# Patient Record
Sex: FEMALE | Race: WHITE | HISPANIC OR LATINO | Employment: UNEMPLOYED | URBAN - METROPOLITAN AREA
[De-identification: names, ages, dates, MRNs, and addresses within clinical notes are randomized per-mention and may not be internally consistent; named-entity substitution may affect disease eponyms.]

---

## 2017-01-27 ENCOUNTER — HOSPITAL ENCOUNTER (OUTPATIENT)
Dept: FAMILY MEDICINE CLINIC | Facility: CLINIC | Age: 42
Setting detail: SPECIMEN
Discharge: HOME | End: 2017-01-27
Attending: FAMILY MEDICINE | Admitting: FAMILY MEDICINE

## 2017-01-27 LAB
ANION GAP SERPL CALC-SCNC: 9.6 MMOL/L (ref 10–20)
BASOPHILS # BLD AUTO: 0 10*3/UL (ref 0–0.2)
BASOPHILS NFR BLD AUTO: 1 % (ref 0–2)
BUN SERPL-MCNC: 14 MG/DL (ref 8–20)
BUN/CREAT SERPL: 23.3 (ref 5.4–26.2)
CALCIUM SERPL-MCNC: 9.4 MG/DL (ref 8.9–10.3)
CHLORIDE SERPL-SCNC: 105 MMOL/L (ref 101–111)
CONV CO2: 29 MMOL/L (ref 22–32)
CREAT UR-MCNC: 0.6 MG/DL (ref 0.4–1)
DIFFERENTIAL METHOD BLD: (no result)
EOSINOPHIL # BLD AUTO: 0.2 10*3/UL (ref 0–0.3)
EOSINOPHIL # BLD AUTO: 3 % (ref 0–3)
ERYTHROCYTE [DISTWIDTH] IN BLOOD BY AUTOMATED COUNT: 15.1 % (ref 11.5–14.5)
GLUCOSE SERPL-MCNC: 95 MG/DL (ref 65–99)
HCT VFR BLD AUTO: 38.7 % (ref 35–49)
HGB BLD-MCNC: 12.8 G/DL (ref 12–15)
LYMPHOCYTES # BLD AUTO: 2.2 10*3/UL (ref 0.8–4.8)
LYMPHOCYTES NFR BLD AUTO: 32 % (ref 18–42)
MCH RBC QN AUTO: 27.7 PG (ref 26–32)
MCHC RBC AUTO-ENTMCNC: 33.2 G/DL (ref 32–36)
MCV RBC AUTO: 83.4 FL (ref 80–94)
MONOCYTES # BLD AUTO: 0.5 10*3/UL (ref 0.1–1.3)
MONOCYTES NFR BLD AUTO: 7 % (ref 2–11)
NEUTROPHILS # BLD AUTO: 4 10*3/UL (ref 2.3–8.6)
NEUTROPHILS NFR BLD AUTO: 57 % (ref 50–75)
NRBC BLD AUTO-RTO: 0 /100{WBCS}
NRBC/RBC NFR BLD MANUAL: 0 10*3/UL
PLATELET # BLD AUTO: 279 10*3/UL (ref 150–450)
PMV BLD AUTO: 7.7 FL (ref 7.4–10.4)
POTASSIUM SERPL-SCNC: 4.6 MMOL/L (ref 3.6–5.1)
RBC # BLD AUTO: 4.64 10*6/UL (ref 4–5.4)
SODIUM SERPL-SCNC: 139 MMOL/L (ref 136–144)
WBC # BLD AUTO: 7 10*3/UL (ref 4.5–11.5)

## 2017-04-20 ENCOUNTER — HOSPITAL ENCOUNTER (OUTPATIENT)
Dept: FAMILY MEDICINE CLINIC | Facility: CLINIC | Age: 42
Setting detail: SPECIMEN
Discharge: HOME | End: 2017-04-20
Attending: FAMILY MEDICINE | Admitting: FAMILY MEDICINE

## 2017-04-20 LAB
ANION GAP SERPL CALC-SCNC: 12.4 MMOL/L (ref 10–20)
BASOPHILS # BLD AUTO: 0 10*3/UL (ref 0–0.2)
BASOPHILS NFR BLD AUTO: 1 % (ref 0–2)
BUN SERPL-MCNC: 15 MG/DL (ref 8–20)
BUN/CREAT SERPL: 21.4 (ref 5.4–26.2)
CALCIUM SERPL-MCNC: 9 MG/DL (ref 8.9–10.3)
CHLORIDE SERPL-SCNC: 107 MMOL/L (ref 101–111)
CONV CO2: 26 MMOL/L (ref 22–32)
CREAT UR-MCNC: 0.7 MG/DL (ref 0.4–1)
DIFFERENTIAL METHOD BLD: (no result)
EOSINOPHIL # BLD AUTO: 0.2 10*3/UL (ref 0–0.3)
EOSINOPHIL # BLD AUTO: 4 % (ref 0–3)
ERYTHROCYTE [DISTWIDTH] IN BLOOD BY AUTOMATED COUNT: 15 % (ref 11.5–14.5)
GLUCOSE SERPL-MCNC: 91 MG/DL (ref 65–99)
HCT VFR BLD AUTO: 36.9 % (ref 35–49)
HGB BLD-MCNC: 12.3 G/DL (ref 12–15)
LYMPHOCYTES # BLD AUTO: 2.4 10*3/UL (ref 0.8–4.8)
LYMPHOCYTES NFR BLD AUTO: 35 % (ref 18–42)
MCH RBC QN AUTO: 27.5 PG (ref 26–32)
MCHC RBC AUTO-ENTMCNC: 33.2 G/DL (ref 32–36)
MCV RBC AUTO: 82.9 FL (ref 80–94)
MONOCYTES # BLD AUTO: 0.4 10*3/UL (ref 0.1–1.3)
MONOCYTES NFR BLD AUTO: 6 % (ref 2–11)
NEUTROPHILS # BLD AUTO: 3.7 10*3/UL (ref 2.3–8.6)
NEUTROPHILS NFR BLD AUTO: 54 % (ref 50–75)
NRBC BLD AUTO-RTO: 0 /100{WBCS}
NRBC/RBC NFR BLD MANUAL: 0 10*3/UL
PLATELET # BLD AUTO: 312 10*3/UL (ref 150–450)
PMV BLD AUTO: 7.8 FL (ref 7.4–10.4)
POTASSIUM SERPL-SCNC: 4.4 MMOL/L (ref 3.6–5.1)
RBC # BLD AUTO: 4.45 10*6/UL (ref 4–5.4)
SODIUM SERPL-SCNC: 141 MMOL/L (ref 136–144)
WBC # BLD AUTO: 6.8 10*3/UL (ref 4.5–11.5)

## 2017-04-27 ENCOUNTER — HOSPITAL ENCOUNTER (OUTPATIENT)
Dept: FAMILY MEDICINE CLINIC | Facility: CLINIC | Age: 42
Setting detail: SPECIMEN
Discharge: HOME | End: 2017-04-27
Attending: FAMILY MEDICINE | Admitting: FAMILY MEDICINE

## 2017-04-27 LAB — TSH SERPL-ACNC: 1.02 UIU/ML (ref 0.34–5.6)

## 2017-09-27 ENCOUNTER — HOSPITAL ENCOUNTER (OUTPATIENT)
Dept: CARDIOLOGY | Facility: HOSPITAL | Age: 42
Discharge: HOME | End: 2017-09-27
Attending: SURGERY | Admitting: SURGERY

## 2017-11-16 ENCOUNTER — HOSPITAL ENCOUNTER (OUTPATIENT)
Dept: FAMILY MEDICINE CLINIC | Facility: CLINIC | Age: 42
Setting detail: SPECIMEN
Discharge: HOME | End: 2017-11-16
Attending: FAMILY MEDICINE | Admitting: FAMILY MEDICINE

## 2017-11-16 LAB
ANION GAP SERPL CALC-SCNC: 11.7 MMOL/L (ref 10–20)
BUN SERPL-MCNC: 12 MG/DL (ref 8–20)
BUN/CREAT SERPL: 17.1 (ref 5.4–26.2)
CALCIUM SERPL-MCNC: 8.9 MG/DL (ref 8.9–10.3)
CHLORIDE SERPL-SCNC: 104 MMOL/L (ref 101–111)
CONV CO2: 26 MMOL/L (ref 22–32)
CREAT UR-MCNC: 0.7 MG/DL (ref 0.4–1)
GLUCOSE SERPL-MCNC: 95 MG/DL (ref 65–99)
POTASSIUM SERPL-SCNC: 3.7 MMOL/L (ref 3.6–5.1)
SODIUM SERPL-SCNC: 138 MMOL/L (ref 136–144)
TSH SERPL-ACNC: 1.63 UIU/ML (ref 0.34–5.6)

## 2018-05-08 ENCOUNTER — HOSPITAL ENCOUNTER (OUTPATIENT)
Dept: FAMILY MEDICINE CLINIC | Facility: CLINIC | Age: 43
Setting detail: SPECIMEN
Discharge: HOME | End: 2018-05-08
Attending: FAMILY MEDICINE | Admitting: FAMILY MEDICINE

## 2018-05-08 LAB
ALBUMIN SERPL-MCNC: 3.8 G/DL (ref 3.5–4.8)
ALBUMIN/GLOB SERPL: 1.1 {RATIO} (ref 1–1.7)
ALP SERPL-CCNC: 120 IU/L (ref 32–91)
ALT SERPL-CCNC: 22 IU/L (ref 14–54)
ANION GAP SERPL CALC-SCNC: 11 MMOL/L (ref 10–20)
AST SERPL-CCNC: 24 IU/L (ref 15–41)
BILIRUB SERPL-MCNC: 0.4 MG/DL (ref 0.3–1.2)
BUN SERPL-MCNC: 13 MG/DL (ref 8–20)
BUN/CREAT SERPL: 18.6 (ref 5.4–26.2)
CALCIUM SERPL-MCNC: 9.1 MG/DL (ref 8.9–10.3)
CHLORIDE SERPL-SCNC: 104 MMOL/L (ref 101–111)
CHOLEST SERPL-MCNC: 163 MG/DL
CHOLEST/HDLC SERPL: 3.4 {RATIO}
CONV CO2: 27 MMOL/L (ref 22–32)
CONV LDL CHOLESTEROL DIRECT: 95 MG/DL (ref 0–100)
CONV TOTAL PROTEIN: 7.3 G/DL (ref 6.1–7.9)
CREAT UR-MCNC: 0.7 MG/DL (ref 0.4–1)
GLOBULIN UR ELPH-MCNC: 3.5 G/DL (ref 2.5–3.8)
GLUCOSE SERPL-MCNC: 87 MG/DL (ref 65–99)
HDLC SERPL-MCNC: 48 MG/DL
LDLC/HDLC SERPL: 2 {RATIO}
LIPID INTERPRETATION: NORMAL
POTASSIUM SERPL-SCNC: 4 MMOL/L (ref 3.6–5.1)
SODIUM SERPL-SCNC: 138 MMOL/L (ref 136–144)
TRIGL SERPL-MCNC: 83 MG/DL
VLDLC SERPL CALC-MCNC: 20.1 MG/DL

## 2019-07-10 RX ORDER — LEVOTHYROXINE SODIUM 0.1 MG/1
TABLET ORAL
Qty: 90 TABLET | Refills: 0 | Status: SHIPPED | OUTPATIENT
Start: 2019-07-10 | End: 2019-10-12 | Stop reason: SDUPTHER

## 2019-09-10 RX ORDER — FUROSEMIDE 20 MG/1
1 TABLET ORAL DAILY
COMMUNITY
Start: 2017-10-21 | End: 2019-10-21

## 2019-09-20 ENCOUNTER — LAB (OUTPATIENT)
Dept: FAMILY MEDICINE CLINIC | Facility: CLINIC | Age: 44
End: 2019-09-20

## 2019-09-20 ENCOUNTER — OFFICE VISIT (OUTPATIENT)
Dept: FAMILY MEDICINE CLINIC | Facility: CLINIC | Age: 44
End: 2019-09-20

## 2019-09-20 ENCOUNTER — TELEPHONE (OUTPATIENT)
Dept: FAMILY MEDICINE CLINIC | Facility: CLINIC | Age: 44
End: 2019-09-20

## 2019-09-20 VITALS
HEART RATE: 57 BPM | BODY MASS INDEX: 35.17 KG/M2 | DIASTOLIC BLOOD PRESSURE: 68 MMHG | HEIGHT: 64 IN | OXYGEN SATURATION: 100 % | SYSTOLIC BLOOD PRESSURE: 110 MMHG | WEIGHT: 206 LBS | TEMPERATURE: 97.5 F

## 2019-09-20 DIAGNOSIS — M53.3 TAIL BONE PAIN: ICD-10-CM

## 2019-09-20 DIAGNOSIS — Z00.00 PREVENTATIVE HEALTH CARE: Primary | ICD-10-CM

## 2019-09-20 DIAGNOSIS — Z00.00 PREVENTATIVE HEALTH CARE: ICD-10-CM

## 2019-09-20 LAB
ALBUMIN SERPL-MCNC: 3.9 G/DL (ref 3.5–4.8)
ALBUMIN/GLOB SERPL: 1.2 G/DL (ref 1–1.7)
ALP SERPL-CCNC: 98 U/L (ref 32–91)
ALT SERPL W P-5'-P-CCNC: 12 U/L (ref 14–54)
ANION GAP SERPL CALCULATED.3IONS-SCNC: 11.7 MMOL/L (ref 5–15)
ARTICHOKE IGE QN: 74 MG/DL (ref 0–100)
AST SERPL-CCNC: 15 U/L (ref 15–41)
BILIRUB SERPL-MCNC: 0.7 MG/DL (ref 0.3–1.2)
BUN BLD-MCNC: 14 MG/DL (ref 8–20)
BUN/CREAT SERPL: 20 (ref 5.4–26.2)
CALCIUM SPEC-SCNC: 9.2 MG/DL (ref 8.9–10.3)
CHLORIDE SERPL-SCNC: 104 MMOL/L (ref 101–111)
CHOLEST SERPL-MCNC: 132 MG/DL
CO2 SERPL-SCNC: 27 MMOL/L (ref 22–32)
CREAT BLD-MCNC: 0.7 MG/DL (ref 0.4–1)
DEPRECATED RDW RBC AUTO: 51.2 FL (ref 37–54)
ERYTHROCYTE [DISTWIDTH] IN BLOOD BY AUTOMATED COUNT: 17.2 % (ref 12.3–15.4)
GFR SERPL CREATININE-BSD FRML MDRD: 110 ML/MIN/1.73
GFR SERPL CREATININE-BSD FRML MDRD: 90 ML/MIN/1.73
GLOBULIN UR ELPH-MCNC: 3.3 GM/DL (ref 2.5–3.8)
GLUCOSE BLD-MCNC: 92 MG/DL (ref 65–99)
HCT VFR BLD AUTO: 40 % (ref 34–46.6)
HDLC SERPL QL: 3
HDLC SERPL-MCNC: 44 MG/DL
HGB BLD-MCNC: 13.2 G/DL (ref 12–15.9)
LDLC/HDLC SERPL: 1.77 {RATIO}
MCH RBC QN AUTO: 28.3 PG (ref 26.6–33)
MCHC RBC AUTO-ENTMCNC: 33.1 G/DL (ref 31.5–35.7)
MCV RBC AUTO: 85.5 FL (ref 79–97)
PLATELET # BLD AUTO: 296 10*3/MM3 (ref 140–450)
PMV BLD AUTO: 7.9 FL (ref 6–12)
POTASSIUM BLD-SCNC: 4.7 MMOL/L (ref 3.6–5.1)
PROT SERPL-MCNC: 7.2 G/DL (ref 6.1–7.9)
RBC # BLD AUTO: 4.67 10*6/MM3 (ref 3.77–5.28)
SODIUM BLD-SCNC: 138 MMOL/L (ref 136–144)
TRIGL SERPL-MCNC: 51 MG/DL
TSH SERPL DL<=0.05 MIU/L-ACNC: 3.66 UIU/ML (ref 0.34–5.6)
VLDLC SERPL-MCNC: 10.2 MG/DL
WBC NRBC COR # BLD: 6.7 10*3/MM3 (ref 3.4–10.8)

## 2019-09-20 PROCEDURE — 80053 COMPREHEN METABOLIC PANEL: CPT | Performed by: FAMILY MEDICINE

## 2019-09-20 PROCEDURE — 99212 OFFICE O/P EST SF 10 MIN: CPT | Performed by: FAMILY MEDICINE

## 2019-09-20 PROCEDURE — 84443 ASSAY THYROID STIM HORMONE: CPT | Performed by: FAMILY MEDICINE

## 2019-09-20 PROCEDURE — 80061 LIPID PANEL: CPT | Performed by: FAMILY MEDICINE

## 2019-09-20 PROCEDURE — 36415 COLL VENOUS BLD VENIPUNCTURE: CPT

## 2019-09-20 PROCEDURE — 85027 COMPLETE CBC AUTOMATED: CPT | Performed by: FAMILY MEDICINE

## 2019-09-20 PROCEDURE — 99396 PREV VISIT EST AGE 40-64: CPT | Performed by: FAMILY MEDICINE

## 2019-09-20 NOTE — PROGRESS NOTES
Subjective   Catherine Nicholas is a 44 y.o. female.     The patient presents with annual physical exam.  She is doing well, complaining of tailbone pain for few months.  She has a known history of fall twice slipped from ice.  The pain describe dull achy, intermittent, mild to moderate intensity and radiating to right hip and leg.  She is a non-smoker.  She is watching diet and has lost more than 50 pound in 5-month.         The following portions of the patient's history were reviewed and updated as appropriate: past medical history, past social history, past surgical history and problem list.    Review of Systems   Constitutional: Negative for activity change, fatigue and fever.   HENT: Negative for sinus pressure, sore throat and trouble swallowing.    Eyes: Negative for blurred vision and visual disturbance.   Respiratory: Negative for cough, shortness of breath and wheezing.    Cardiovascular: Negative for chest pain and palpitations.   Gastrointestinal: Negative for abdominal pain, nausea and vomiting.   Endocrine: Negative for cold intolerance, polyphagia and polyuria.   Genitourinary: Negative for dysuria and flank pain.   Musculoskeletal: Positive for back pain.        Tail bone pain   Neurological: Negative for dizziness, weakness and headache.   Psychiatric/Behavioral: Negative for depressed mood. The patient is not nervous/anxious.        Objective   Physical Exam   Constitutional: She is oriented to person, place, and time. She appears well-developed.   HENT:   Head: Normocephalic.   Mouth/Throat: Oropharynx is clear and moist.   Eyes: EOM are normal. Pupils are equal, round, and reactive to light.   Neck: Normal range of motion. Neck supple. No thyromegaly present.   Cardiovascular: Normal rate and regular rhythm.   Pulmonary/Chest: Effort normal and breath sounds normal. She has no wheezes.   Abdominal: Soft. Bowel sounds are normal. There is no tenderness.   Musculoskeletal: Normal range of motion.    Negative tenderness at lower back and right hip.     Neurological: She is alert and oriented to person, place, and time.   Skin: Skin is warm.   Psychiatric: She has a normal mood and affect.   Vitals reviewed.        Assessment/Plan   Problems Addressed this Visit        Nervous and Auditory    Tail bone pain     Symptom management, will check x-ray and refer for physical therapy.         Relevant Orders    XR Sacrum & Coccyx    Ambulatory Referral to Physical Therapy       Other    Preventative health care - Primary     Discussed preventive care protocol and  importance of regular exercise and recommend starting or continuing a regular exercise program for good health.  Annual flu shots are recommended.         Relevant Orders    Lipid panel    Comprehensive metabolic panel    CBC No Differential    TSH

## 2019-09-20 NOTE — ASSESSMENT & PLAN NOTE
Discussed preventive care protocol and  importance of regular exercise and recommend starting or continuing a regular exercise program for good health.  Annual flu shots are recommended.

## 2019-09-23 ENCOUNTER — TELEPHONE (OUTPATIENT)
Dept: FAMILY MEDICINE CLINIC | Facility: CLINIC | Age: 44
End: 2019-09-23

## 2019-09-23 NOTE — TELEPHONE ENCOUNTER
Patient needing to know xray results and told her thought they were normal, but still having a lot of pain.

## 2019-09-24 ENCOUNTER — TREATMENT (OUTPATIENT)
Dept: PHYSICAL THERAPY | Facility: CLINIC | Age: 44
End: 2019-09-24

## 2019-09-24 DIAGNOSIS — M53.3 COCCYX PAIN: Primary | ICD-10-CM

## 2019-09-24 PROCEDURE — 97110 THERAPEUTIC EXERCISES: CPT | Performed by: PHYSICAL THERAPIST

## 2019-09-24 PROCEDURE — 97014 ELECTRIC STIMULATION THERAPY: CPT | Performed by: PHYSICAL THERAPIST

## 2019-09-24 PROCEDURE — 97161 PT EVAL LOW COMPLEX 20 MIN: CPT | Performed by: PHYSICAL THERAPIST

## 2019-09-24 NOTE — PROGRESS NOTES
Physical Therapy Initial Evaluation and Plan of Care    Patient: Catherine Nicholas   : 1975  Diagnosis/ICD-10 Code:  Coccyx pain [M53.3]  Referring practitioner: Gilberto Manzanares MD  Date of Initial Visit: 2019  Today's Date: 2019  Patient seen for  sessions           Subjective Questionnaire: Oswestry:       Subjective Evaluation    History of Present Illness  Mechanism of injury: Pt is a 45 yo female with c/o increased pain in tailbone/R post hip.  Initial pain began after fall in 2019, then another slip/fall on ice in 2019.  Has been losing weight and noticed that pain worsened as she lost more weight. Doesn't bother her currently when sitting but really hurts in her bed if she tries to sit (long sitting) sitting to read.  Inc pull after she gets out of car when she drives for very long.  Current pain in tailbone - none.  When she is sitting in her bed pain up to 7-8/10.  Has tried a donut pillow with no benefit.  Pain more on her R side.  Worse with shifting weight to L and lifting her R foot up, better the opposite.  Has been sleeping on her L side with R leg up.  Typically would use several pillows and alternate sides.  At times pain wakes her L side becomes uncomfortable.  R leg irritates her with walking up stairs.     Sits a lot at work and feels a pulling pain in her R side into hip/thigh.      Quality of life: good    Pain  Current pain ratin  At best pain ratin  At worst pain ratin  Location: Central, R LB into glut  Quality: dull ache  Relieving factors: change in position  Aggravating factors: sleeping and prolonged positioning    Social Support  Lives in: one-story house  Lives with: spouse    Diagnostic Tests  X-ray: normal (priority)    Treatments  Current treatment: physical therapy  Patient Goals  Patient goals for therapy: decreased pain, increased motion and independence with ADLs/IADLs             Objective       Palpation     Additional Palpation  Details  Tenderness localized R SI and piriformis muscle    Lumbar Screen  Lumbar range of motion within normal limits.    Strength/Myotome Testing     Left Hip   Planes of Motion   Flexion: 5  Abduction: 5  Adduction: 5    Right Hip   Planes of Motion   Flexion: 5  Abduction: 5  Adduction: 5    Left Knee   Flexion: 5  Extension: 5    Right Knee   Flexion: 5  Extension: 5    Left Ankle/Foot   Dorsiflexion: 5    Right Ankle/Foot   Dorsiflexion: 5         Assessment & Plan     Assessment  Impairments: activity intolerance and pain with function  Assessment details: Pt has increased pain/tenderness tailbone and R post hip.  Difficulty longsitting.  Inc pain with recliner positioning.  Inc pulling after driving.  Dec R hip IR/ER active mobility.  Difficulty climbing stairs.  Difficulty sleeping.    Prognosis: good  Functional Limitations: sleeping, uncomfortable because of pain and sitting  Goals  Plan Goals: STG  Pt I with HEP in 2 weeks  To dec pain in R post hip 2-3/10 max in 2-3 weeks  To jo ann driving without inc pain/pulling in 2-3 weeks  LTG  To improve R hip ROM WFL with no inc pain in 4-6 weeks  To dec pain 0-1/10 max in 4-6 weeks  To jo ann sleep without inc pain in 4-6 weeks.    To jo ann longsitting without inc pain in 4-6 weeks.      Plan  Therapy options: will be seen for skilled physical therapy services  Planned modality interventions: electrical stimulation/Russian stimulation, thermotherapy (hydrocollator packs), ultrasound and cryotherapy  Planned therapy interventions: abdominal trunk stabilization, ADL retraining, body mechanics training, flexibility, functional ROM exercises, manual therapy, neuromuscular re-education, postural training, soft tissue mobilization, strengthening, stretching and therapeutic activities  Frequency: 1x week  Duration in visits: 10  Treatment plan discussed with: patient  Plan details: Provided HEP and completed estim R piriformis with dec pain and good jo ann post tx.  Plan to  focus on gentle mobility and gentle progressive hip strengthening.  Will continue modalities as needed for pain.          Timed:         Manual Therapy:         mins  27883;     Therapeutic Exercise:    10     mins  53285;     Neuromuscular Ladan:        mins  06786;    Therapeutic Activity:          mins  29301;     Gait Training:           mins  36974;     Ultrasound:          mins  53817;    Ionto                                   mins   12926    Un-Timed:  Electrical Stimulation:    15     mins  50044 ( );  Dry Needling          mins self-pay  Traction          mins 62147  Low Eval     30     Mins  02056  Mod Eval          Mins  47639  High Eval                            Mins  49683        Timed Treatment:   10   mins   Total Treatment:     55   mins    PT SIGNATURE: Raquel Banks, PT   DATE TREATMENT INITIATED: 9/24/2019    Initial Certification  Certification Period: 12/23/2019  I certify that the therapy services are furnished while this patient is under my care.  The services outlined above are required by this patient, and will be reviewed every 90 days.     PHYSICIAN: Gilberto Manzanares MD      DATE:     Please sign and return via fax to [phone].. Thank you, [facility].

## 2019-09-30 ENCOUNTER — OFFICE VISIT (OUTPATIENT)
Dept: FAMILY MEDICINE CLINIC | Facility: CLINIC | Age: 44
End: 2019-09-30

## 2019-09-30 VITALS
HEART RATE: 67 BPM | WEIGHT: 205 LBS | BODY MASS INDEX: 35 KG/M2 | TEMPERATURE: 98.5 F | OXYGEN SATURATION: 99 % | DIASTOLIC BLOOD PRESSURE: 70 MMHG | SYSTOLIC BLOOD PRESSURE: 104 MMHG | HEIGHT: 64 IN

## 2019-09-30 DIAGNOSIS — Z01.419 ENCNTR FOR GYN EXAM (GENERAL) (ROUTINE) W/O ABN FINDINGS: Primary | ICD-10-CM

## 2019-09-30 DIAGNOSIS — Z12.31 ENCOUNTER FOR SCREENING MAMMOGRAM FOR BREAST CANCER: ICD-10-CM

## 2019-09-30 PROCEDURE — 87624 HPV HI-RISK TYP POOLED RSLT: CPT | Performed by: FAMILY MEDICINE

## 2019-09-30 PROCEDURE — G0148 SCR C/V CYTO, AUTOSYS, RESCR: HCPCS

## 2019-09-30 PROCEDURE — 99396 PREV VISIT EST AGE 40-64: CPT | Performed by: FAMILY MEDICINE

## 2019-09-30 RX ORDER — METHOCARBAMOL 500 MG/1
2 TABLET, FILM COATED ORAL EVERY 8 HOURS PRN
Refills: 0 | COMMUNITY
Start: 2019-09-26 | End: 2019-10-21
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
## 2019-10-02 ENCOUNTER — TREATMENT (OUTPATIENT)
Dept: PHYSICAL THERAPY | Facility: CLINIC | Age: 44
End: 2019-10-02

## 2019-10-02 DIAGNOSIS — M53.3 COCCYX PAIN: Primary | ICD-10-CM

## 2019-10-02 PROCEDURE — 97014 ELECTRIC STIMULATION THERAPY: CPT | Performed by: PHYSICAL THERAPIST

## 2019-10-02 PROCEDURE — 97140 MANUAL THERAPY 1/> REGIONS: CPT | Performed by: PHYSICAL THERAPIST

## 2019-10-02 PROCEDURE — 97110 THERAPEUTIC EXERCISES: CPT | Performed by: PHYSICAL THERAPIST
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
## 2019-10-03 LAB
AGE GDLN ACOG TESTING: NORMAL
CHROM ANALY OVERALL INTERP-IMP: NORMAL
CONV .: NORMAL
CONV PERFORMED BY:: NORMAL
DX ICD CODE: NORMAL
HIV 1 & 2 AB SER-IMP: NORMAL
HPV I/H RISK 1 DNA CVX QL PROBE+SIG AMP: NEGATIVE
REF LAB TEST METHOD: NORMAL
STAT OF ADQ CVX/VAG CYTO-IMP: NORMAL

## 2019-10-04 ENCOUNTER — APPOINTMENT (OUTPATIENT)
Dept: MAMMOGRAPHY | Facility: HOSPITAL | Age: 44
End: 2019-10-04

## 2019-10-09 ENCOUNTER — TREATMENT (OUTPATIENT)
Dept: PHYSICAL THERAPY | Facility: CLINIC | Age: 44
End: 2019-10-09

## 2019-10-09 DIAGNOSIS — M53.3 COCCYX PAIN: Primary | ICD-10-CM

## 2019-10-09 PROCEDURE — 97014 ELECTRIC STIMULATION THERAPY: CPT | Performed by: PHYSICAL THERAPIST

## 2019-10-09 PROCEDURE — 97140 MANUAL THERAPY 1/> REGIONS: CPT | Performed by: PHYSICAL THERAPIST

## 2019-10-09 PROCEDURE — 97110 THERAPEUTIC EXERCISES: CPT | Performed by: PHYSICAL THERAPIST

## 2019-10-09 NOTE — PROGRESS NOTES
Physical Therapy Daily Progress Note        Patient: Catherine Nicholas   : 1975  Diagnosis/ICD-10 Code:  Coccyx pain [M53.3]  Referring practitioner: Gilberto Manzanares MD  Date of Initial Visit: Type: THERAPY  Noted: 2019  Today's Date: 10/9/2019  Patient seen for 3 sessions             Subjective Was hurting really bad in lower back  and Monday. Some better today.  Still mostly R and central lower back.  Neck is improving but really irrtated it last week at [employer] study because she was looking down for 45 min at her tablet.      Objective No new changes.    See Exercise, Manual, and Modality Logs for complete treatment.       Assessment/Plan  Poor thoracic mobility.  Moderate pelvic asymmetry R ASIS ant to L.      Progress per Plan of Care           Timed:         Manual Therapy:    8     mins  71495;     Therapeutic Exercise:    15     mins  44566;     Neuromuscular Ladan:        mins  95691;    Therapeutic Activity:          mins  73599;     Gait Training:           mins  97922;     Ultrasound:          mins  85975;    Ionto                                   mins   48488  Self Care                            mins   35173  Canalith Repos                   mins  4209    Un-Timed:  Electrical Stimulation:    15     mins  08573 ( );  Dry Needling          mins self-pay  Traction          mins 20438  Low Eval          Mins  63779  Mod Eval          Mins  98357  High Eval                            Mins  29706    Timed Treatment:   23   mins   Total Treatment:     38   mins    Raquel Banks PT  Physical Therapist

## 2019-10-12 RX ORDER — LEVOTHYROXINE SODIUM 0.1 MG/1
TABLET ORAL
Qty: 90 TABLET | Refills: 0 | Status: SHIPPED | OUTPATIENT
Start: 2019-10-12 | End: 2020-01-08

## 2019-10-16 ENCOUNTER — HOSPITAL ENCOUNTER (OUTPATIENT)
Dept: MAMMOGRAPHY | Facility: HOSPITAL | Age: 44
Discharge: HOME | End: 2019-10-16
Admitting: FAMILY MEDICINE

## 2019-10-16 PROCEDURE — 77063 BREAST TOMOSYNTHESIS BI: CPT

## 2019-10-16 PROCEDURE — 77067 SCR MAMMO BI INCL CAD: CPT

## 2019-10-17 ENCOUNTER — TREATMENT (OUTPATIENT)
Dept: PHYSICAL THERAPY | Facility: CLINIC | Age: 44
End: 2019-10-17

## 2019-10-17 DIAGNOSIS — M53.3 COCCYX PAIN: Primary | ICD-10-CM

## 2019-10-17 PROCEDURE — 97014 ELECTRIC STIMULATION THERAPY: CPT | Performed by: PHYSICAL THERAPIST

## 2019-10-17 PROCEDURE — 97140 MANUAL THERAPY 1/> REGIONS: CPT | Performed by: PHYSICAL THERAPIST

## 2019-10-17 PROCEDURE — 97110 THERAPEUTIC EXERCISES: CPT | Performed by: PHYSICAL THERAPIST

## 2019-10-17 NOTE — PROGRESS NOTES
Physical Therapy Daily Progress Note        Patient: Catherine Nicholas   : 1975  Diagnosis/ICD-10 Code:  Coccyx pain [M53.3]  Referring practitioner: Gilberto Manzanares MD  Date of Initial Visit: Type: THERAPY  Noted: 2019  Today's Date: 10/17/2019  Patient seen for 4 sessions             Subjective Pt stated that she is still hurting in her R side lower back and into neck/shoulders.  Planning to follow up with MD regarding continued pain.      Objective Began with manual stretching and ex.  Finished with estim/mhp.    See Exercise, Manual, and Modality Logs for complete treatment.       Assessment/Plan Mild pelvic asymmetry.  Difficulty with transfers.      Progress per Plan of Care           Timed:         Manual Therapy:    10     mins  25863;     Therapeutic Exercise:    15     mins  16379;     Neuromuscular Ladan:        mins  86306;    Therapeutic Activity:          mins  21574;     Gait Training:           mins  85325;     Ultrasound:          mins  26166;    Ionto                                   mins   16848  Self Care                            mins   67253  Canalith Repos                   mins  4209    Un-Timed:  Electrical Stimulation:    15     mins  69950 ( );  Dry Needling          mins self-pay  Traction          mins 47185  Low Eval          Mins  16939  Mod Eval          Mins  50029  High Eval                            Mins  58708    Timed Treatment:   25   mins   Total Treatment:     40   mins    Raquel Banks PT  Physical Therapist

## 2019-10-18 DIAGNOSIS — R92.8 ABNORMAL MAMMOGRAM OF LEFT BREAST: Primary | ICD-10-CM

## 2019-10-21 ENCOUNTER — OFFICE VISIT (OUTPATIENT)
Dept: FAMILY MEDICINE CLINIC | Facility: CLINIC | Age: 44
End: 2019-10-21

## 2019-10-21 VITALS
OXYGEN SATURATION: 100 % | SYSTOLIC BLOOD PRESSURE: 106 MMHG | WEIGHT: 203 LBS | HEART RATE: 48 BPM | TEMPERATURE: 99.3 F | BODY MASS INDEX: 34.66 KG/M2 | HEIGHT: 64 IN | DIASTOLIC BLOOD PRESSURE: 68 MMHG

## 2019-10-21 DIAGNOSIS — M54.50 ACUTE LOW BACK PAIN WITHOUT SCIATICA, UNSPECIFIED BACK PAIN LATERALITY: Primary | ICD-10-CM

## 2019-10-21 DIAGNOSIS — M54.2 NECK PAIN: ICD-10-CM

## 2019-10-21 PROCEDURE — 99213 OFFICE O/P EST LOW 20 MIN: CPT | Performed by: FAMILY MEDICINE

## 2019-10-21 RX ORDER — METHOCARBAMOL 500 MG/1
500 TABLET, FILM COATED ORAL 3 TIMES DAILY PRN
Qty: 20 TABLET | Refills: 0 | Status: SHIPPED | OUTPATIENT
Start: 2019-10-21 | End: 2021-02-15

## 2019-10-21 RX ORDER — MELOXICAM 15 MG/1
15 TABLET ORAL DAILY
Qty: 20 TABLET | Refills: 1 | Status: SHIPPED | OUTPATIENT
Start: 2019-10-21 | End: 2021-02-15

## 2019-10-23 ENCOUNTER — TREATMENT (OUTPATIENT)
Dept: PHYSICAL THERAPY | Facility: CLINIC | Age: 44
End: 2019-10-23

## 2019-10-23 DIAGNOSIS — M54.2 CERVICALGIA: Primary | ICD-10-CM

## 2019-10-23 DIAGNOSIS — M53.3 COCCYX PAIN: Primary | ICD-10-CM

## 2019-10-23 PROCEDURE — 97014 ELECTRIC STIMULATION THERAPY: CPT | Performed by: PHYSICAL THERAPIST

## 2019-10-23 PROCEDURE — 97161 PT EVAL LOW COMPLEX 20 MIN: CPT | Performed by: PHYSICAL THERAPIST

## 2019-10-23 PROCEDURE — 97110 THERAPEUTIC EXERCISES: CPT | Performed by: PHYSICAL THERAPIST
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
## 2019-10-23 NOTE — PROGRESS NOTES
Physical Therapy Daily Progress Note        Patient: Catherine Nicholas   : 1975  Diagnosis/ICD-10 Code:  Coccyx pain [M53.3]  Referring practitioner: Gilberto Manzanares MD  Date of Initial Visit: Type: THERAPY  Noted: 2019  Today's Date: 10/23/2019  Patient seen for 5 sessions             Subjective Pt stated she is still having R lower back pain.  Some better.  Saw MD Monday for her neck and has a new referral to start PT for that.      Objective   See Exercise, Manual, and Modality Logs for complete treatment.       Assessment/Plan Pt jo ann stretching well.     Progress strengthening /stabilization /functional activity           Timed:         Manual Therapy:    5     mins  65145;     Therapeutic Exercise:    15     mins  85169;     Neuromuscular Ladan:        mins  53364;    Therapeutic Activity:          mins  95199;     Gait Training:           mins  49971;     Ultrasound:          mins  44052;    Ionto                                   mins   00949  Self Care                            mins   39043  Canalith Repos                   mins  4209    Un-Timed:  Electrical Stimulation:    15     mins  34633 ( );  Dry Needling          mins self-pay  Traction          mins 24951  Low Eval          Mins  21462  Mod Eval          Mins  55662  High Eval                            Mins  08328    Timed Treatment:   20   mins   Total Treatment:     35   mins    Raquel Banks, PT  Physical Therapist

## 2019-10-25 ENCOUNTER — TELEPHONE (OUTPATIENT)
Dept: FAMILY MEDICINE CLINIC | Facility: CLINIC | Age: 44
End: 2019-10-25

## 2019-10-25 ENCOUNTER — HOSPITAL ENCOUNTER (OUTPATIENT)
Dept: MAMMOGRAPHY | Facility: HOSPITAL | Age: 44
Discharge: HOME | End: 2019-10-25
Admitting: FAMILY MEDICINE

## 2019-10-25 ENCOUNTER — HOSPITAL ENCOUNTER (OUTPATIENT)
Dept: ULTRASOUND IMAGING | Facility: HOSPITAL | Age: 44
Discharge: HOME | End: 2019-10-25

## 2019-10-25 DIAGNOSIS — R92.8 ABNORMAL MAMMOGRAM OF LEFT BREAST: ICD-10-CM

## 2019-10-25 DIAGNOSIS — R92.8 ABNORMAL MAMMOGRAM OF LEFT BREAST: Primary | ICD-10-CM

## 2019-10-25 PROCEDURE — 76642 ULTRASOUND BREAST LIMITED: CPT

## 2019-10-25 PROCEDURE — G0279 TOMOSYNTHESIS, MAMMO: HCPCS

## 2019-10-25 PROCEDURE — 77065 DX MAMMO INCL CAD UNI: CPT

## 2019-10-25 NOTE — TELEPHONE ENCOUNTER
Hospital called and needs you to put in an order for a U/S Core biopsy of the left breast, her diagnostic mammogram was abnormal.

## 2019-10-30 ENCOUNTER — TREATMENT (OUTPATIENT)
Dept: PHYSICAL THERAPY | Facility: CLINIC | Age: 44
End: 2019-10-30

## 2019-10-30 DIAGNOSIS — M53.3 COCCYX PAIN: Primary | ICD-10-CM

## 2019-10-30 DIAGNOSIS — M54.2 CERVICALGIA: Primary | ICD-10-CM

## 2019-10-30 PROCEDURE — 97014 ELECTRIC STIMULATION THERAPY: CPT | Performed by: PHYSICAL THERAPIST

## 2019-10-30 PROCEDURE — 97140 MANUAL THERAPY 1/> REGIONS: CPT | Performed by: PHYSICAL THERAPIST

## 2019-10-30 NOTE — PROGRESS NOTES
Physical Therapy Daily Progress Note        Patient: Catherine Nicholas   : 1975  Diagnosis/ICD-10 Code:  Coccyx pain [M53.3]  Referring practitioner: Gilberto Manzanares MD  Date of Initial Visit: Type: THERAPY  Noted: 2019  Today's Date: 10/30/2019  Patient seen for 6 sessions             Subjective Continued Lower back with worse R post hip pain.      Objective Pelvis asymmetry.  Improved with muscle energy correction.  No new changes.    See Exercise, Manual, and Modality Logs for complete treatment.       Assessment/Plan Continued pain and poor tolerance to transfers.      Progress per Plan of Care           Timed:         Manual Therapy:    15     mins  07188;     Therapeutic Exercise:    10     mins  81576;     Neuromuscular Ladan:        mins  35859;    Therapeutic Activity:          mins  80138;     Gait Training:           mins  33089;     Ultrasound:          mins  00502;    Ionto                                   mins   83652  Self Care                            mins   07364  Canalith Repos                   mins  4209    Un-Timed:  Electrical Stimulation:         mins  82293 (MC );  Dry Needling          mins self-pay  Traction          mins 18036  Low Eval          Mins  60772  Mod Eval          Mins  96974  High Eval                            Mins  03861    Timed Treatment:   25   mins   Total Treatment:     40   mins    Raquel Banks PT  Physical Therapist

## 2019-10-30 NOTE — PROGRESS NOTES
Physical Therapy Daily Progress Note        Patient: Catherine Nicholas   : 1975  Diagnosis/ICD-10 Code:  Cervicalgia [M54.2]  Referring practitioner: Gilberto Manzanares MD  Date of Initial Visit: Type: THERAPY  Noted: 10/23/2019  Today's Date: 10/30/2019  Patient seen for 2 sessions           Subjective Questionnaire: NDI:    Subjective Pt stated B shoulders( upper traps) are sore.      Objective   See Exercise, Manual, and Modality Logs for complete treatment.       Assessment/Plan Pt tolerated stretching and ex well.      Progress per Plan of Care           Timed:         Manual Therapy:    15     mins  58232;     Therapeutic Exercise:    5     mins  89044;     Neuromuscular Ladan:        mins  57457;    Therapeutic Activity:          mins  74341;     Gait Training:           mins  96072;     Ultrasound:          mins  43803;    Ionto                                   mins   13898  Self Care                            mins   03885  Canalith Repos                   mins  4209    Un-Timed:  Electrical Stimulation:    15     mins  99741 (MC );  Dry Needling          mins self-pay  Traction          mins 44115  Low Eval          Mins  52777  Mod Eval          Mins  04950  High Eval                            Mins  58670    Timed Treatment:   20   mins   Total Treatment:     35   mins    Raquel Banks PT  Physical Therapist

## 2019-10-31 ENCOUNTER — HOSPITAL ENCOUNTER (OUTPATIENT)
Dept: ULTRASOUND IMAGING | Facility: HOSPITAL | Age: 44
Discharge: HOME | End: 2019-10-31
Admitting: FAMILY MEDICINE

## 2019-10-31 ENCOUNTER — HOSPITAL ENCOUNTER (OUTPATIENT)
Dept: MAMMOGRAPHY | Facility: HOSPITAL | Age: 44
Discharge: HOME | End: 2019-10-31

## 2019-10-31 DIAGNOSIS — R92.8 ABNORMAL MAMMOGRAM OF LEFT BREAST: ICD-10-CM

## 2019-10-31 DIAGNOSIS — R92.8 ABNORMAL MAMMOGRAM: ICD-10-CM

## 2019-10-31 PROCEDURE — 88305 TISSUE EXAM BY PATHOLOGIST: CPT | Performed by: FAMILY MEDICINE

## 2019-10-31 PROCEDURE — 25010000003 LIDOCAINE 1 % SOLUTION: Performed by: FAMILY MEDICINE

## 2019-10-31 RX ORDER — LIDOCAINE HYDROCHLORIDE AND EPINEPHRINE 10; 10 MG/ML; UG/ML
10 INJECTION, SOLUTION INFILTRATION; PERINEURAL ONCE
Status: COMPLETED | OUTPATIENT
Start: 2019-10-31 | End: 2019-10-31

## 2019-10-31 RX ORDER — LIDOCAINE HYDROCHLORIDE 10 MG/ML
5 INJECTION, SOLUTION INFILTRATION; PERINEURAL ONCE
Status: COMPLETED | OUTPATIENT
Start: 2019-10-31 | End: 2019-10-31

## 2019-10-31 RX ADMIN — LIDOCAINE HYDROCHLORIDE,EPINEPHRINE BITARTRATE 7 ML: 10; .01 INJECTION, SOLUTION INFILTRATION; PERINEURAL at 14:16

## 2019-10-31 RX ADMIN — LIDOCAINE HYDROCHLORIDE 5 ML: 10 INJECTION, SOLUTION INFILTRATION; PERINEURAL at 14:16

## 2019-11-01 LAB
LAB AP CASE REPORT: NORMAL
LAB AP DIAGNOSIS COMMENT: NORMAL
PATH REPORT.FINAL DX SPEC: NORMAL
PATH REPORT.GROSS SPEC: NORMAL

## 2019-11-07 ENCOUNTER — TREATMENT (OUTPATIENT)
Dept: PHYSICAL THERAPY | Facility: CLINIC | Age: 44
End: 2019-11-07

## 2019-11-07 DIAGNOSIS — M54.2 CERVICALGIA: Primary | ICD-10-CM

## 2019-11-07 PROCEDURE — 97140 MANUAL THERAPY 1/> REGIONS: CPT | Performed by: PHYSICAL THERAPIST

## 2019-11-07 PROCEDURE — 97014 ELECTRIC STIMULATION THERAPY: CPT | Performed by: PHYSICAL THERAPIST

## 2019-11-07 PROCEDURE — 97110 THERAPEUTIC EXERCISES: CPT | Performed by: PHYSICAL THERAPIST

## 2019-11-07 NOTE — PROGRESS NOTES
Physical Therapy Daily Progress Note        Patient: Catherine Nicholas   : 1975  Diagnosis/ICD-10 Code:  Cervicalgia [M54.2]  Referring practitioner: Gilberto Manzanares MD  Date of Initial Visit: Type: THERAPY  Noted: 10/23/2019  Today's Date: 2019  Patient seen for 3 sessions             Subjective Continued trouble with headaches and neck tightness.  Recent biopsy results came back good.      Objective Began with stretching an postural exercise.  Finished with estim.   See Exercise, Manual, and Modality Logs for complete treatment.       Assessment/Plan Pt jo ann all tx well with dec pain after session.      Progress per Plan of Care           Timed:         Manual Therapy:    15     mins  41073;     Therapeutic Exercise:    10     mins  20936;     Neuromuscular Ladan:        mins  10623;    Therapeutic Activity:          mins  88698;     Gait Training:           mins  93677;     Ultrasound:          mins  16846;    Ionto                                   mins   35955  Self Care                            mins   04630  Canalith Repos                   mins  4209    Un-Timed:  Electrical Stimulation:    15     mins  94500 ( );  Dry Needling          mins self-pay  Traction          mins 64162  Low Eval          Mins  14351  Mod Eval          Mins  41867  High Eval                            Mins  94391    Timed Treatment:   25   mins   Total Treatment:     40   mins    Raquel Banks PT  Physical Therapist

## 2019-11-08 ENCOUNTER — TREATMENT (OUTPATIENT)
Dept: PHYSICAL THERAPY | Facility: CLINIC | Age: 44
End: 2019-11-08

## 2019-11-08 DIAGNOSIS — M54.2 CERVICALGIA: Primary | ICD-10-CM

## 2019-11-08 DIAGNOSIS — M53.3 COCCYX PAIN: Primary | ICD-10-CM

## 2019-11-08 PROCEDURE — 97014 ELECTRIC STIMULATION THERAPY: CPT | Performed by: PHYSICAL THERAPIST

## 2019-11-08 PROCEDURE — 97140 MANUAL THERAPY 1/> REGIONS: CPT | Performed by: PHYSICAL THERAPIST

## 2019-11-08 PROCEDURE — 97110 THERAPEUTIC EXERCISES: CPT | Performed by: PHYSICAL THERAPIST

## 2019-11-08 NOTE — PROGRESS NOTES
Physical Therapy Daily Progress Note        Patient: Catherine Nicholas   : 1975  Diagnosis/ICD-10 Code:  Cervicalgia [M54.2]  Referring practitioner: Gilberto Manzanares MD  Date of Initial Visit: Type: THERAPY  Noted: 10/23/2019  Today's Date: 2019  Patient seen for 4 sessions             Subjective Pt stated that her neck got worse later in day after a busy day at work yesterday.  Better after she got home and used hot pac and took hot shower.  Slept well.  Better this am.      Objective Began with manual stretching, completed gentle postural strengthening and finished with estim.    See Exercise, Manual, and Modality Logs for complete treatment.       Assessment/Plan Improving UT tightness, improving pain. Continued pain at end of day after working on computer.      Progress strengthening /stabilization /functional activity           Timed:         Manual Therapy:    15     mins  31514;     Therapeutic Exercise:    5     mins  82399;     Neuromuscular Ladan:        mins  40626;    Therapeutic Activity:          mins  38345;     Gait Training:           mins  63358;     Ultrasound:          mins  87958;    Ionto                                   mins   27162  Self Care                            mins   35354  Canalith Repos                   mins  4209    Un-Timed:  Electrical Stimulation:    15     mins  44329 ( );  Dry Needling          mins self-pay  Traction          mins 82270  Low Eval          Mins  45420  Mod Eval          Mins  15521  High Eval                            Mins  99050    Timed Treatment:   20   mins   Total Treatment:     40   mins    Raquel Banks PT  Physical Therapist

## 2019-11-08 NOTE — PROGRESS NOTES
Physical Therapy Daily Progress Note        Patient: Catherine Nicholas   : 1975  Diagnosis/ICD-10 Code:  Coccyx pain [M53.3]  Referring practitioner: Gilberto Manzanares MD  Date of Initial Visit: Type: THERAPY  Noted: 2019  Today's Date: 2019  Patient seen for 7 sessions             Subjective Pt stated she has been sleeping better.  Her tailbone is still sensitive to sitting upright.  Does better if she leans forward.  Can't tolerate leaning back in a reclined position.      Objective Focused on soft tissue work initially.  Completed gentle hip/core strengthening and finished with estim.    See Exercise, Manual, and Modality Logs for complete treatment.       Assessment/Plan Gradually improving R post hip/tailbone pain.     Progress per Plan of Care           Timed:         Manual Therapy:    15     mins  87761;     Therapeutic Exercise:    10     mins  09369;     Neuromuscular Ladan:        mins  25867;    Therapeutic Activity:          mins  35188;     Gait Training:           mins  53972;     Ultrasound:          mins  54975;    Ionto                                   mins   00689  Self Care                            mins   98349  Canalith Repos                   mins  4209    Un-Timed:  Electrical Stimulation:         mins  59990 ( );  Dry Needling          mins self-pay  Traction          mins 55101  Low Eval          Mins  96400  Mod Eval          Mins  99321  High Eval                            Mins  46656    Timed Treatment:   25   mins   Total Treatment:     40   mins    Raquel Banks PT  Physical Therapist

## 2019-11-13 ENCOUNTER — TREATMENT (OUTPATIENT)
Dept: PHYSICAL THERAPY | Facility: CLINIC | Age: 44
End: 2019-11-13

## 2019-11-13 DIAGNOSIS — M53.3 COCCYX PAIN: Primary | ICD-10-CM

## 2019-11-13 PROCEDURE — 97140 MANUAL THERAPY 1/> REGIONS: CPT | Performed by: PHYSICAL THERAPIST

## 2019-11-13 PROCEDURE — 97014 ELECTRIC STIMULATION THERAPY: CPT | Performed by: PHYSICAL THERAPIST

## 2019-11-13 PROCEDURE — 97110 THERAPEUTIC EXERCISES: CPT | Performed by: PHYSICAL THERAPIST

## 2019-11-13 NOTE — PROGRESS NOTES
Physical Therapy Daily Progress Note        Patient: Catherine Nicholas   : 1975  Diagnosis/ICD-10 Code:  Coccyx pain [M53.3]  Referring practitioner: Gilberto Manzanares MD  Date of Initial Visit: Type: THERAPY  Noted: 2019  Today's Date: 2019  Patient seen for 8 sessions           Subjective Pt stated that she is sleeping some better.  Tenderness still R post hip.   was helping to massage the area and it is still really sensitive.      Objective Estim to R post/lat hip only this visit.    See Exercise, Manual, and Modality Logs for complete treatment.       Assessment/Plan Pt jo ann stretching well.  Tenderness noted R PSIS and piriformis.      Progress per Plan of Care           Timed:         Manual Therapy:    15     mins  98508;     Therapeutic Exercise:    10     mins  51662;     Neuromuscular Ladan:        mins  53851;    Therapeutic Activity:          mins  40616;     Gait Training:           mins  63813;     Ultrasound:          mins  33406;    Ionto                                   mins   59622  Self Care                            mins   92222  Canalith Repos                   mins  4209    Un-Timed:  Electrical Stimulation:    15     mins  96414 ( );  Dry Needling          mins self-pay  Traction          mins 21811  Low Eval          Mins  25412  Mod Eval          Mins  50912  High Eval                            Mins  68598    Timed Treatment:   25   mins   Total Treatment:     40   mins    Raquel Banks PT  Physical Therapist

## 2019-11-15 ENCOUNTER — TREATMENT (OUTPATIENT)
Dept: PHYSICAL THERAPY | Facility: CLINIC | Age: 44
End: 2019-11-15

## 2019-11-15 DIAGNOSIS — M54.2 CERVICALGIA: Primary | ICD-10-CM

## 2019-11-15 DIAGNOSIS — M53.3 COCCYX PAIN: Primary | ICD-10-CM

## 2019-11-15 PROCEDURE — 97140 MANUAL THERAPY 1/> REGIONS: CPT | Performed by: PHYSICAL THERAPIST

## 2019-11-15 PROCEDURE — 97014 ELECTRIC STIMULATION THERAPY: CPT | Performed by: PHYSICAL THERAPIST

## 2019-11-15 PROCEDURE — 97110 THERAPEUTIC EXERCISES: CPT | Performed by: PHYSICAL THERAPIST

## 2019-11-15 NOTE — PROGRESS NOTES
Physical Therapy Daily Progress Note        Patient: Catherine Nicholas   : 1975  Diagnosis/ICD-10 Code:  Cervicalgia [M54.2]  Referring practitioner: Gilberto Manzanares MD  Date of Initial Visit: Type: THERAPY  Noted: 10/23/2019  Today's Date: 11/15/2019  Patient seen for 5 sessions             Subjective Most tightness R side neck today.  Sore/tight.  Worked late in office last night.      Objective added chin tucks  See Exercise, Manual, and Modality Logs for complete treatment.       Assessment/Plan Pt jo ann stretching and new ex well.      Progress strengthening /stabilization /functional activity           Timed:         Manual Therapy:    15     mins  68841;     Therapeutic Exercise:    10     mins  41382;     Neuromuscular Ladan:        mins  69134;    Therapeutic Activity:          mins  64912;     Gait Training:           mins  62049;     Ultrasound:          mins  69238;    Ionto                                   mins   40742  Self Care                            mins   55580  Canalith Repos                   mins  4209    Un-Timed:  Electrical Stimulation:    15     mins  06614 ( );  Dry Needling          mins self-pay  Traction          mins 34907  Low Eval          Mins  80613  Mod Eval          Mins  56707  High Eval                            Mins  90489    Timed Treatment:   25   mins   Total Treatment:     40   mins    Raquel Banks PT  Physical Therapist

## 2019-11-15 NOTE — PROGRESS NOTES
Physical Therapy Daily Progress Note        Patient: Catherine Nicholas   : 1975  Diagnosis/ICD-10 Code:  Coccyx pain [M53.3]  Referring practitioner: Gilberto Manzanares MD  Date of Initial Visit: Type: THERAPY  Noted: 2019  Today's Date: 11/15/2019  Patient seen for 9 sessions             Subjective Pt stated that her back is doing slightly better.      Objective Reviewed HEP and provided green tband (sit -> stand, wall push up, tband row, tband low row, bicep curl)  See Exercise, Manual, and Modality Logs for complete treatment.       Assessment/Plan Good jo ann to stretching and ex.  No sig sensitivity during STM this visit.     Progress strengthening /stabilization /functional activity           Timed:         Manual Therapy:    8     mins  89681;     Therapeutic Exercise:    15     mins  82075;     Neuromuscular Ladan:        mins  47822;    Therapeutic Activity:          mins  23946;     Gait Training:           mins  35926;     Ultrasound:          mins  58973;    Ionto                                   mins   71998  Self Care                            mins   05659  Canalith Repos                   mins  4209    Un-Timed:  Electrical Stimulation:         mins  22614 ( );  Dry Needling          mins self-pay  Traction          mins 75382  Low Eval          Mins  48588  Mod Eval          Mins  66562  High Eval                            Mins  19369    Timed Treatment:  23    mins   Total Treatment:     38   mins    Raquel Banks PT  Physical Therapist

## 2019-11-21 PROBLEM — M54.2 NECK PAIN: Status: ACTIVE | Noted: 2019-11-21

## 2019-11-21 PROBLEM — M54.50 ACUTE LOW BACK PAIN WITHOUT SCIATICA: Status: ACTIVE | Noted: 2019-11-21

## 2019-11-22 NOTE — ASSESSMENT & PLAN NOTE
Symptom management, activity as tolerated   Rx for Flexeril and meloxicam.    We will refer for physical therapy.

## 2019-11-27 ENCOUNTER — TREATMENT (OUTPATIENT)
Dept: PHYSICAL THERAPY | Facility: CLINIC | Age: 44
End: 2019-11-27

## 2019-11-27 DIAGNOSIS — M54.2 CERVICALGIA: Primary | ICD-10-CM

## 2019-11-27 PROCEDURE — 97110 THERAPEUTIC EXERCISES: CPT | Performed by: PHYSICAL THERAPIST

## 2019-11-27 PROCEDURE — 97014 ELECTRIC STIMULATION THERAPY: CPT | Performed by: PHYSICAL THERAPIST

## 2019-11-27 PROCEDURE — 97140 MANUAL THERAPY 1/> REGIONS: CPT | Performed by: PHYSICAL THERAPIST

## 2019-11-27 NOTE — PROGRESS NOTES
Physical Therapy Daily Progress Note        Patient: Catherine Nicholas   : 1975  Diagnosis/ICD-10 Code:  Cervicalgia [M54.2]  Referring practitioner: Gilberto Manzanares MD  Date of Initial Visit: Type: THERAPY  Noted: 10/23/2019  Today's Date: 2019  Patient seen for 6 sessions             Subjective Pt stated that she had a rough week last week.  Neck has been really sore.  Ice did help when it got really bad.      Objective Added resisted row/low row.    See Exercise, Manual, and Modality Logs for complete treatment.       Assessment/Plan Pt jo ann stretching and ex well.      Progress per Plan of Care           Timed:         Manual Therapy:    15     mins  08154;     Therapeutic Exercise:    15     mins  84053;     Neuromuscular Ladan:        mins  79480;    Therapeutic Activity:          mins  63877;     Gait Training:           mins  63839;     Ultrasound:          mins  57414;    Ionto                                   mins   19902  Self Care                            mins   71094  Canalith Repos                   mins  4209    Un-Timed:  Electrical Stimulation:    15     mins  84960 (MC );  Dry Needling          mins self-pay  Traction          mins 40880  Low Eval          Mins  78554  Mod Eval          Mins  46420  High Eval                            Mins  10331    Timed Treatment:   30   mins   Total Treatment:     45   mins    Raquel Banks PT  Physical Therapist

## 2019-12-03 ENCOUNTER — TREATMENT (OUTPATIENT)
Dept: PHYSICAL THERAPY | Facility: CLINIC | Age: 44
End: 2019-12-03

## 2019-12-03 DIAGNOSIS — M54.2 CERVICALGIA: Primary | ICD-10-CM

## 2019-12-03 PROCEDURE — 97140 MANUAL THERAPY 1/> REGIONS: CPT | Performed by: PHYSICAL THERAPIST

## 2019-12-03 PROCEDURE — 97110 THERAPEUTIC EXERCISES: CPT | Performed by: PHYSICAL THERAPIST

## 2019-12-03 PROCEDURE — 97014 ELECTRIC STIMULATION THERAPY: CPT | Performed by: PHYSICAL THERAPIST

## 2019-12-03 NOTE — PROGRESS NOTES
Physical Therapy Daily Progress Note        Patient: Catherine Nicholas   : 1975  Diagnosis/ICD-10 Code:  Cervicalgia [M54.2]  Referring practitioner: Gilberto Manzanares MD  Date of Initial Visit: Type: THERAPY  Noted: 10/23/2019  Today's Date: 12/3/2019  Patient seen for 7 sessions             Subjective Pt stated that she is doing some better.  Was able to rest and be off work for a long weekend.  Has a busy week this week but doing well.      Objective Began with manual tx, then postural exercise, finished with estim.    See Exercise, Manual, and Modality Logs for complete treatment.       Assessment/Plan Dec pain and improving headaches.   Improving tolerance to work activities.      Progress per Plan of Care           Timed:         Manual Therapy:    15     mins  66565;     Therapeutic Exercise:    10     mins  22822;     Neuromuscular Ladan:        mins  62217;    Therapeutic Activity:          mins  18933;     Gait Training:           mins  26033;     Ultrasound:          mins  45804;    Ionto                                   mins   03148  Self Care                            mins   69908  Canalith Repos                   mins  4209    Un-Timed:  Electrical Stimulation:    15     mins  96065 ( );  Dry Needling          mins self-pay  Traction          mins 47536  Low Eval          Mins  67488  Mod Eval          Mins  04194  High Eval                            Mins  83560    Timed Treatment:   25   mins   Total Treatment:     40   mins    Raquel Banks PT  Physical Therapist

## 2019-12-05 ENCOUNTER — TREATMENT (OUTPATIENT)
Dept: PHYSICAL THERAPY | Facility: CLINIC | Age: 44
End: 2019-12-05

## 2019-12-05 DIAGNOSIS — M54.2 CERVICALGIA: Primary | ICD-10-CM

## 2019-12-05 PROCEDURE — 97110 THERAPEUTIC EXERCISES: CPT | Performed by: PHYSICAL THERAPIST

## 2019-12-05 PROCEDURE — 97140 MANUAL THERAPY 1/> REGIONS: CPT | Performed by: PHYSICAL THERAPIST

## 2019-12-05 PROCEDURE — 97014 ELECTRIC STIMULATION THERAPY: CPT | Performed by: PHYSICAL THERAPIST

## 2019-12-05 NOTE — PROGRESS NOTES
Physical Therapy Daily Progress Note        Patient: Catherine Nicholas   : 1975  Diagnosis/ICD-10 Code:  Cervicalgia [M54.2]  Referring practitioner: Gilberto Manzanares MD  Date of Initial Visit: Type: THERAPY  Noted: 10/23/2019  Today's Date: 2019  Patient seen for 8 sessions             Subjective Pt stated she got her hair done last night.  Was up late but neck doing ok so far today.      Objective   See Exercise, Manual, and Modality Logs for complete treatment.       Assessment/Plan Dec jo ann to row (lightened weight).  Good tolerance to stretching.      Progress per Plan of Care           Timed:         Manual Therapy:    15     mins  80934;     Therapeutic Exercise:    10     mins  90354;     Neuromuscular Ladan:        mins  66753;    Therapeutic Activity:          mins  01154;     Gait Training:           mins  99437;     Ultrasound:          mins  49980;    Ionto                                   mins   31656  Self Care                            mins   21790  Canalith Repos                   mins  4209    Un-Timed:  Electrical Stimulation:    15     mins  04923 ( );  Dry Needling          mins self-pay  Traction          mins 72212  Low Eval          Mins  72739  Mod Eval          Mins  27190  High Eval                            Mins  92564    Timed Treatment:   25   mins   Total Treatment:     40   mins    Raquel Banks PT  Physical Therapist

## 2019-12-10 ENCOUNTER — TREATMENT (OUTPATIENT)
Dept: PHYSICAL THERAPY | Facility: CLINIC | Age: 44
End: 2019-12-10

## 2019-12-10 DIAGNOSIS — M54.2 CERVICALGIA: Primary | ICD-10-CM

## 2019-12-10 PROCEDURE — 97110 THERAPEUTIC EXERCISES: CPT | Performed by: PHYSICAL THERAPIST

## 2019-12-10 PROCEDURE — 97140 MANUAL THERAPY 1/> REGIONS: CPT | Performed by: PHYSICAL THERAPIST

## 2019-12-10 PROCEDURE — 97014 ELECTRIC STIMULATION THERAPY: CPT | Performed by: PHYSICAL THERAPIST

## 2019-12-10 NOTE — PROGRESS NOTES
Physical Therapy Daily Progress Note        Patient: Catherine Nicholas   : 1975  Diagnosis/ICD-10 Code:  Cervicalgia [M54.2]  Referring practitioner: Gilberto Manzanares MD  Date of Initial Visit: Type: THERAPY  Noted: 10/23/2019  Today's Date: 12/10/2019  Patient seen for 9 sessions             Subjective Had some soreness over the weekend.  Doing ok this am.      Objective Began with stretching and exercise, finished with estim.    See Exercise, Manual, and Modality Logs for complete treatment.       Assessment/Plan Good tolerance to stretching.      Progress per Plan of Care           Timed:         Manual Therapy:    15     mins  38032;     Therapeutic Exercise:    10     mins  40049;     Neuromuscular Ladan:        mins  31703;    Therapeutic Activity:          mins  19016;     Gait Training:           mins  84228;     Ultrasound:          mins  81688;    Ionto                                   mins   05210  Self Care                            mins   25581  Canalith Repos                   mins  4209    Un-Timed:  Electrical Stimulation:    15     mins  53867 (MC );  Dry Needling          mins self-pay  Traction          mins 33185  Low Eval          Mins  65990  Mod Eval          Mins  03780  High Eval                            Mins  78834    Timed Treatment:   25   mins   Total Treatment:     40   mins    Raquel Banks PT  Physical Therapist

## 2019-12-12 ENCOUNTER — TREATMENT (OUTPATIENT)
Dept: PHYSICAL THERAPY | Facility: CLINIC | Age: 44
End: 2019-12-12

## 2019-12-12 DIAGNOSIS — M53.3 COCCYX PAIN: ICD-10-CM

## 2019-12-12 DIAGNOSIS — M54.2 CERVICALGIA: Primary | ICD-10-CM

## 2019-12-12 PROCEDURE — 97110 THERAPEUTIC EXERCISES: CPT | Performed by: PHYSICAL THERAPIST

## 2019-12-12 PROCEDURE — 97014 ELECTRIC STIMULATION THERAPY: CPT | Performed by: PHYSICAL THERAPIST

## 2019-12-12 PROCEDURE — 97140 MANUAL THERAPY 1/> REGIONS: CPT | Performed by: PHYSICAL THERAPIST

## 2019-12-12 NOTE — PROGRESS NOTES
Physical Therapy Daily Progress Note        Patient: Catherine Nicholas   : 1975  Diagnosis/ICD-10 Code:  Cervicalgia [M54.2]  Referring practitioner: Gilberto Manzanares MD  Date of Initial Visit: Type: THERAPY  Noted: 10/23/2019  Today's Date: 2019  Patient seen for 10 sessions             Subjective Pt stated that she had a headache that started at base of skull yesterday.  Doing better today.      Objective Began with manual stretching and completed postural strengthening.  Finished with estim/heat.    See Exercise, Manual, and Modality Logs for complete treatment.       Assessment/Plan Gradually improving pain and dec incidence of headaches.  Improving tolerance to exercise.  Add weight with ER ex next visit.      Progress per Plan of Care           Timed:         Manual Therapy:    15     mins  59694;     Therapeutic Exercise:    10     mins  42746;     Neuromuscular Ladan:        mins  95880;    Therapeutic Activity:          mins  21770;     Gait Training:           mins  03526;     Ultrasound:          mins  63553;    Ionto                                   mins   05079  Self Care                            mins   49356  Canalith Repos                   mins  4209    Un-Timed:  Electrical Stimulation:    15     mins  59433 ( );  Dry Needling          mins self-pay  Traction          mins 47836  Low Eval          Mins  63559  Mod Eval          Mins  21905  High Eval                            Mins  68403    Timed Treatment:   25   mins   Total Treatment:     40   mins    Raquel Banks PT  Physical Therapist

## 2019-12-27 ENCOUNTER — TREATMENT (OUTPATIENT)
Dept: PHYSICAL THERAPY | Facility: CLINIC | Age: 44
End: 2019-12-27

## 2019-12-27 DIAGNOSIS — M53.3 COCCYX PAIN: ICD-10-CM

## 2019-12-27 DIAGNOSIS — M54.2 CERVICALGIA: Primary | ICD-10-CM

## 2019-12-27 PROCEDURE — 97110 THERAPEUTIC EXERCISES: CPT | Performed by: PHYSICAL THERAPIST

## 2019-12-27 PROCEDURE — 97140 MANUAL THERAPY 1/> REGIONS: CPT | Performed by: PHYSICAL THERAPIST

## 2019-12-27 PROCEDURE — 97014 ELECTRIC STIMULATION THERAPY: CPT | Performed by: PHYSICAL THERAPIST

## 2019-12-27 NOTE — PROGRESS NOTES
Physical Therapy Daily Progress Note        Patient: Catherine Nicholas   : 1975  Diagnosis/ICD-10 Code:  Cervicalgia [M54.2]  Referring practitioner: Gilberto Manzanares MD  Date of Initial Visit: Type: THERAPY  Noted: 10/23/2019  Today's Date: 2019  Patient seen for 11 sessions             Subjective Pt stated that she had a good trip.  Did have difficulty with R post hip pain with sitting so long in the car.  Some neck stiffness/soreness.      Objective Began with manual stretching.  Completed gentle exercise.  Finished with estim.    See Exercise, Manual, and Modality Logs for complete treatment.       Assessment/Plan Pt tolerated stretching and exercise well.      Progress per Plan of Care           Timed:         Manual Therapy:    15     mins  97794;     Therapeutic Exercise:    15     mins  79570;     Neuromuscular Ladan:        mins  47321;    Therapeutic Activity:          mins  61226;     Gait Training:           mins  60798;     Ultrasound:          mins  99736;    Ionto                                   mins   62420  Self Care                            mins   03704  Canalith Repos                   mins  4209    Un-Timed:  Electrical Stimulation:    15     mins  62618 ( );  Dry Needling          mins self-pay  Traction          mins 14605  Low Eval          Mins  84825  Mod Eval          Mins  73543  High Eval                            Mins  82285    Timed Treatment:   30   mins   Total Treatment:     45   mins    Raquel Banks PT  Physical Therapist

## 2019-12-31 ENCOUNTER — TREATMENT (OUTPATIENT)
Dept: PHYSICAL THERAPY | Facility: CLINIC | Age: 44
End: 2019-12-31

## 2019-12-31 DIAGNOSIS — M53.3 COCCYX PAIN: ICD-10-CM

## 2019-12-31 DIAGNOSIS — M54.2 CERVICALGIA: Primary | ICD-10-CM

## 2019-12-31 PROCEDURE — 97110 THERAPEUTIC EXERCISES: CPT | Performed by: PHYSICAL THERAPIST

## 2019-12-31 PROCEDURE — 97014 ELECTRIC STIMULATION THERAPY: CPT | Performed by: PHYSICAL THERAPIST

## 2019-12-31 PROCEDURE — 97140 MANUAL THERAPY 1/> REGIONS: CPT | Performed by: PHYSICAL THERAPIST

## 2019-12-31 NOTE — PROGRESS NOTES
Physical Therapy Daily Progress Note        Patient: Catherine Nicholas   : 1975  Diagnosis/ICD-10 Code:  Cervicalgia [M54.2]  Referring practitioner: Gilberto Manzanares MD  Date of Initial Visit: Type: THERAPY  Noted: 10/23/2019  Today's Date: 2019  Patient seen for 12 sessions             Subjective Continued tailbone pain.  Irritated since trip to [place].  Tried to sit to watch a movie with her kids and tailbone region was hurting.  Neck/shoulders not too bad today.      Objective No new changes  See Exercise, Manual, and Modality Logs for complete treatment.       Assessment/Plan Some inc pain along back and into lower back with chin tuck.      Progress per Plan of Care           Timed:         Manual Therapy:    15     mins  89618;     Therapeutic Exercise:    15     mins  12175;     Neuromuscular Ladan:        mins  94061;    Therapeutic Activity:          mins  19201;     Gait Training:           mins  14654;     Ultrasound:          mins  34986;    Ionto                                   mins   55803  Self Care                            mins   47186  Canalith Repos                   mins  4209    Un-Timed:  Electrical Stimulation:    15     mins  16953 ( );  Dry Needling          mins self-pay  Traction          mins 72932  Low Eval          Mins  04085  Mod Eval          Mins  54365  High Eval                            Mins  10657    Timed Treatment:  30    mins   Total Treatment:     45   mins    Raquel Banks PT  Physical Therapist

## 2020-01-03 ENCOUNTER — TREATMENT (OUTPATIENT)
Dept: PHYSICAL THERAPY | Facility: CLINIC | Age: 45
End: 2020-01-03

## 2020-01-03 DIAGNOSIS — M54.2 CERVICALGIA: Primary | ICD-10-CM

## 2020-01-03 PROCEDURE — 97140 MANUAL THERAPY 1/> REGIONS: CPT | Performed by: PHYSICAL THERAPIST

## 2020-01-03 PROCEDURE — 97110 THERAPEUTIC EXERCISES: CPT | Performed by: PHYSICAL THERAPIST

## 2020-01-03 PROCEDURE — 97014 ELECTRIC STIMULATION THERAPY: CPT | Performed by: PHYSICAL THERAPIST

## 2020-01-03 NOTE — PROGRESS NOTES
Physical Therapy Daily Progress Note        Patient: Catherine Nicholas   : 1975  Diagnosis/ICD-10 Code:  Cervicalgia [M54.2]  Referring practitioner: Gilberto Manzanares MD  Date of Initial Visit: Type: THERAPY  Noted: 10/23/2019  Today's Date: 1/3/2020  Patient seen for 13 sessions             Subjective Pt stated that neck pain comes/goes.  Some better but still irritating at times.  Plannning to follow up with MD regarding continued R LB/pelvis pain.  Not sure what they will be able to do.  Discussed possible injections with pt.      Objective No new changes.   See Exercise, Manual, and Modality Logs for complete treatment.       Assessment/Plan Mild UT tightness.  Good exercise tolerance.      Progress per Plan of Care           Timed:         Manual Therapy:    15     mins  41746;     Therapeutic Exercise:    10     mins  39730;     Neuromuscular Ladan:        mins  52919;    Therapeutic Activity:          mins  35457;     Gait Training:           mins  32187;     Ultrasound:          mins  13357;    Ionto                                   mins   22439  Self Care                            mins   94182  Canalith Repos                   mins  4209    Un-Timed:  Electrical Stimulation:    15     mins  57081 ( );  Dry Needling          mins self-pay  Traction          mins 99486  Low Eval          Mins  49651  Mod Eval          Mins  63429  High Eval                            Mins  63943    Timed Treatment:   25   mins   Total Treatment:     40   mins    Raquel Banks PT  Physical Therapist

## 2020-01-08 RX ORDER — LEVOTHYROXINE SODIUM 0.1 MG/1
TABLET ORAL
Qty: 90 TABLET | Refills: 3 | Status: SHIPPED | OUTPATIENT
Start: 2020-01-08 | End: 2021-01-19

## 2020-01-10 ENCOUNTER — TREATMENT (OUTPATIENT)
Dept: PHYSICAL THERAPY | Facility: CLINIC | Age: 45
End: 2020-01-10

## 2020-01-10 DIAGNOSIS — M54.2 CERVICALGIA: Primary | ICD-10-CM

## 2020-01-10 PROCEDURE — 97110 THERAPEUTIC EXERCISES: CPT | Performed by: PHYSICAL THERAPIST

## 2020-01-10 PROCEDURE — 97014 ELECTRIC STIMULATION THERAPY: CPT | Performed by: PHYSICAL THERAPIST

## 2020-01-10 PROCEDURE — 97140 MANUAL THERAPY 1/> REGIONS: CPT | Performed by: PHYSICAL THERAPIST

## 2020-01-10 NOTE — PROGRESS NOTES
Physical Therapy Daily Progress Note        Patient: Catherine Nicholas   : 1975  Diagnosis/ICD-10 Code:  Cervicalgia [M54.2]  Referring practitioner: Gilberto Manzanares MD  Date of Initial Visit: Type: THERAPY  Noted: 10/23/2019  Today's Date: 1/10/2020  Patient seen for 14 sessions             Subjective No headaches recently.  Mild neck pain.  Overall doing well.      Objective No new changes.    See Exercise, Manual, and Modality Logs for complete treatment.       Assessment/Plan Pt jo ann stretching and exercise well with no inc pain.      Progress per Plan of Care           Timed:         Manual Therapy:    15     mins  72291;     Therapeutic Exercise:    10     mins  47264;     Neuromuscular Ladan:        mins  32255;    Therapeutic Activity:          mins  74353;     Gait Training:           mins  14297;     Ultrasound:          mins  21302;    Ionto                                   mins   31131  Self Care                            mins   04904  Canalith Repos                   mins  4209    Un-Timed:  Electrical Stimulation:    15     mins  66989 (MC );  Dry Needling          mins self-pay  Traction          mins 56005  Low Eval          Mins  96300  Mod Eval          Mins  84799  High Eval                            Mins  88803    Timed Treatment:   25   mins   Total Treatment:     40   mins    Raquel Banks PT  Physical Therapist

## 2020-04-01 ENCOUNTER — DOCUMENTATION (OUTPATIENT)
Dept: PHYSICAL THERAPY | Facility: CLINIC | Age: 45
End: 2020-04-01

## 2020-04-01 NOTE — PROGRESS NOTES
Discharge Summary  Discharge Summary from Physical Therapy Report      Dates  PT visit: Sept 2019-Jan 2020  Number of Visits:  14 visits for neck pain     9 visits for back pain     Discharge Status of Patient: Improved headaches and dec neck pain.  Overall doing well.  Managing tailbone pain as best she can.  Limits time in certain positions to dec pain.      Goals: Partially Met    Discharge Plan: Continue with current home exercise program as instructed    Comments Plan to DC with HEP at this time.  Pt to follow up with MD as needed.      Date of Discharge 1/15/20        Raquel Banks, PT  Physical Therapist

## 2021-01-19 RX ORDER — LEVOTHYROXINE SODIUM 0.1 MG/1
TABLET ORAL
Qty: 30 TABLET | Refills: 0 | Status: SHIPPED | OUTPATIENT
Start: 2021-01-19 | End: 2021-02-15 | Stop reason: SDUPTHER

## 2021-02-15 ENCOUNTER — TELEMEDICINE (OUTPATIENT)
Dept: FAMILY MEDICINE CLINIC | Facility: CLINIC | Age: 46
End: 2021-02-15

## 2021-02-15 DIAGNOSIS — E78.2 MIXED HYPERLIPIDEMIA: Chronic | ICD-10-CM

## 2021-02-15 DIAGNOSIS — E03.9 HYPOTHYROIDISM, UNSPECIFIED TYPE: Primary | Chronic | ICD-10-CM

## 2021-02-15 PROBLEM — M54.2 NECK PAIN: Status: RESOLVED | Noted: 2019-11-21 | Resolved: 2021-02-15

## 2021-02-15 PROBLEM — M54.50 ACUTE LOW BACK PAIN WITHOUT SCIATICA: Status: RESOLVED | Noted: 2019-11-21 | Resolved: 2021-02-15

## 2021-02-15 PROBLEM — M53.3 TAIL BONE PAIN: Status: RESOLVED | Noted: 2019-09-20 | Resolved: 2021-02-15

## 2021-02-15 PROBLEM — E78.5 HYPERLIPIDEMIA: Status: ACTIVE | Noted: 2021-02-15

## 2021-02-15 PROCEDURE — 99214 OFFICE O/P EST MOD 30 MIN: CPT | Performed by: FAMILY MEDICINE

## 2021-02-15 RX ORDER — LEVOTHYROXINE SODIUM 0.1 MG/1
100 TABLET ORAL DAILY
Qty: 90 TABLET | Refills: 3 | Status: SHIPPED | OUTPATIENT
Start: 2021-02-15 | End: 2021-07-23 | Stop reason: SDUPTHER

## 2021-02-15 NOTE — PROGRESS NOTES
Subjective   Catherine Nicholas is a 45 y.o. female.     You have chosen to receive care through a telehealth visit.  Do you consent to use a video/audio connection for your medical care today? Yes.    Telehealth visit in view of COVID-19 pandemic.  Patient presents for follow-up on hypothyroidism and hyperlipidemia.    The patient  presents with f/u on  hypothyroidism and hyperlipidemia.  She complains of fatigue and weight gain but denies cold intolerance, chest pain, palpitations, shortness of breath, trouble swallowing, anxiety, nervousness, dry skin and hair loss.  Since the last visit, the patient states  she is taking medication as directed.      The following portions of the patient's history were reviewed and updated as appropriate: past medical history, past social history, past surgical history and problem list.    Review of Systems   Constitutional: Positive for fatigue. Negative for activity change, appetite change and fever.        Weight gain   HENT: Negative for sore throat and trouble swallowing.    Eyes: Negative for visual disturbance.   Respiratory: Negative for cough, shortness of breath and wheezing.    Cardiovascular: Negative for chest pain and palpitations.   Gastrointestinal: Negative for abdominal pain, nausea, vomiting, GERD and indigestion.   Endocrine: Negative for cold intolerance, heat intolerance, polydipsia, polyphagia and polyuria.   Neurological: Negative for dizziness and headache.   Psychiatric/Behavioral: Negative for sleep disturbance and depressed mood. The patient is not nervous/anxious.        Objective   Physical Exam  Neurological:      Mental Status: She is alert and oriented to person, place, and time.   Psychiatric:         Mood and Affect: Mood normal.     Video visit in view of COVID-19 pandemic.  Current Outpatient Medications on File Prior to Visit   Medication Sig Dispense Refill   • [DISCONTINUED] levothyroxine (SYNTHROID, LEVOTHROID) 100 MCG tablet TAKE 1 TABLET BY  MOUTH DAILY 30 tablet 0   • [DISCONTINUED] meloxicam (MOBIC) 15 MG tablet Take 1 tablet by mouth Daily. 20 tablet 1   • [DISCONTINUED] methocarbamol (ROBAXIN) 500 MG tablet Take 1 tablet by mouth 3 (Three) Times a Day As Needed for Muscle Spasms. 20 tablet 0     No current facility-administered medications on file prior to visit.            Assessment/Plan   Problems Addressed this Visit        Cardiac and Vasculature    Hyperlipidemia     Will check FLP and CMP.   Nutritional counseling was provided.  Lipids will be reassessed in 3 months.         Relevant Orders    TSH    Lipid panel    Comprehensive metabolic panel    CBC w AUTO Differential       Endocrine and Metabolic    Hypothyroidism - Primary     Stable we will check TSH and free T4.  Continue current dose of levothyroxine.    This is a video visit [employer] titi. was used to complete this visits. Total time of discussion was 25 minutes.         Relevant Medications    levothyroxine (SYNTHROID, LEVOTHROID) 100 MCG tablet    Other Relevant Orders    TSH    T4, free    Lipid panel    Comprehensive metabolic panel    CBC w AUTO Differential      Diagnoses       Codes Comments    Hypothyroidism, unspecified type    -  Primary ICD-10-CM: E03.9  ICD-9-CM: 244.9     Mixed hyperlipidemia     ICD-10-CM: E78.2  ICD-9-CM: 272.2

## 2021-02-16 NOTE — ASSESSMENT & PLAN NOTE
Will check FLP and CMP.   Nutritional counseling was provided.  Lipids will be reassessed in 3 months.

## 2021-02-16 NOTE — ASSESSMENT & PLAN NOTE
Stable we will check TSH and free T4.  Continue current dose of levothyroxine.    This is a video visit [employer] titi. was used to complete this visits. Total time of discussion was 25 minutes.

## 2021-02-24 ENCOUNTER — LAB (OUTPATIENT)
Dept: LAB | Facility: HOSPITAL | Age: 46
End: 2021-02-24

## 2021-02-24 DIAGNOSIS — E03.9 HYPOTHYROIDISM, UNSPECIFIED TYPE: ICD-10-CM

## 2021-02-24 DIAGNOSIS — E78.2 MIXED HYPERLIPIDEMIA: ICD-10-CM

## 2021-02-24 LAB
ALBUMIN SERPL-MCNC: 4.3 G/DL (ref 3.5–5.2)
ALBUMIN/GLOB SERPL: 1.6 G/DL
ALP SERPL-CCNC: 112 U/L (ref 39–117)
ALT SERPL W P-5'-P-CCNC: 10 U/L (ref 1–33)
ANION GAP SERPL CALCULATED.3IONS-SCNC: 10.6 MMOL/L (ref 5–15)
AST SERPL-CCNC: 17 U/L (ref 1–32)
BASOPHILS # BLD AUTO: 0.02 10*3/MM3 (ref 0–0.2)
BASOPHILS NFR BLD AUTO: 0.3 % (ref 0–1.5)
BILIRUB SERPL-MCNC: 0.5 MG/DL (ref 0–1.2)
BUN SERPL-MCNC: 20 MG/DL (ref 6–20)
BUN/CREAT SERPL: 23.8 (ref 7–25)
CALCIUM SPEC-SCNC: 9 MG/DL (ref 8.6–10.5)
CHLORIDE SERPL-SCNC: 102 MMOL/L (ref 98–107)
CHOLEST SERPL-MCNC: 160 MG/DL (ref 0–200)
CO2 SERPL-SCNC: 26.4 MMOL/L (ref 22–29)
CREAT SERPL-MCNC: 0.84 MG/DL (ref 0.57–1)
DEPRECATED RDW RBC AUTO: 40.5 FL (ref 37–54)
EOSINOPHIL # BLD AUTO: 0.12 10*3/MM3 (ref 0–0.4)
EOSINOPHIL NFR BLD AUTO: 1.9 % (ref 0.3–6.2)
ERYTHROCYTE [DISTWIDTH] IN BLOOD BY AUTOMATED COUNT: 13.1 % (ref 12.3–15.4)
GFR SERPL CREATININE-BSD FRML MDRD: 73 ML/MIN/1.73
GFR SERPL CREATININE-BSD FRML MDRD: 88 ML/MIN/1.73
GLOBULIN UR ELPH-MCNC: 2.7 GM/DL
GLUCOSE SERPL-MCNC: 83 MG/DL (ref 65–99)
HCT VFR BLD AUTO: 39.3 % (ref 34–46.6)
HDLC SERPL-MCNC: 63 MG/DL (ref 40–60)
HGB BLD-MCNC: 13.3 G/DL (ref 12–15.9)
IMM GRANULOCYTES # BLD AUTO: 0.02 10*3/MM3 (ref 0–0.05)
IMM GRANULOCYTES NFR BLD AUTO: 0.3 % (ref 0–0.5)
LDLC SERPL CALC-MCNC: 83 MG/DL (ref 0–100)
LDLC/HDLC SERPL: 1.32 {RATIO}
LYMPHOCYTES # BLD AUTO: 2.2 10*3/MM3 (ref 0.7–3.1)
LYMPHOCYTES NFR BLD AUTO: 34.8 % (ref 19.6–45.3)
MCH RBC QN AUTO: 29.4 PG (ref 26.6–33)
MCHC RBC AUTO-ENTMCNC: 33.8 G/DL (ref 31.5–35.7)
MCV RBC AUTO: 86.8 FL (ref 79–97)
MONOCYTES # BLD AUTO: 0.42 10*3/MM3 (ref 0.1–0.9)
MONOCYTES NFR BLD AUTO: 6.6 % (ref 5–12)
NEUTROPHILS NFR BLD AUTO: 3.55 10*3/MM3 (ref 1.7–7)
NEUTROPHILS NFR BLD AUTO: 56.1 % (ref 42.7–76)
NRBC BLD AUTO-RTO: 0.2 /100 WBC (ref 0–0.2)
PLATELET # BLD AUTO: 294 10*3/MM3 (ref 140–450)
PMV BLD AUTO: 9.6 FL (ref 6–12)
POTASSIUM SERPL-SCNC: 4.1 MMOL/L (ref 3.5–5.2)
PROT SERPL-MCNC: 7 G/DL (ref 6–8.5)
RBC # BLD AUTO: 4.53 10*6/MM3 (ref 3.77–5.28)
SODIUM SERPL-SCNC: 139 MMOL/L (ref 136–145)
T4 FREE SERPL-MCNC: 1.58 NG/DL (ref 0.93–1.7)
TRIGL SERPL-MCNC: 70 MG/DL (ref 0–150)
TSH SERPL DL<=0.05 MIU/L-ACNC: 4.36 UIU/ML (ref 0.27–4.2)
VLDLC SERPL-MCNC: 14 MG/DL (ref 5–40)
WBC # BLD AUTO: 6.33 10*3/MM3 (ref 3.4–10.8)

## 2021-02-24 PROCEDURE — 84439 ASSAY OF FREE THYROXINE: CPT

## 2021-02-24 PROCEDURE — 36415 COLL VENOUS BLD VENIPUNCTURE: CPT

## 2021-02-24 PROCEDURE — 80053 COMPREHEN METABOLIC PANEL: CPT

## 2021-02-24 PROCEDURE — 80061 LIPID PANEL: CPT

## 2021-02-24 PROCEDURE — 84443 ASSAY THYROID STIM HORMONE: CPT

## 2021-02-24 PROCEDURE — 85025 COMPLETE CBC W/AUTO DIFF WBC: CPT

## 2021-03-01 ENCOUNTER — TELEPHONE (OUTPATIENT)
Dept: FAMILY MEDICINE CLINIC | Facility: CLINIC | Age: 46
End: 2021-03-01

## 2021-05-27 ENCOUNTER — OFFICE VISIT (OUTPATIENT)
Dept: FAMILY MEDICINE CLINIC | Facility: CLINIC | Age: 46
End: 2021-05-27

## 2021-05-27 VITALS
WEIGHT: 245 LBS | DIASTOLIC BLOOD PRESSURE: 85 MMHG | BODY MASS INDEX: 42.05 KG/M2 | OXYGEN SATURATION: 98 % | TEMPERATURE: 97.3 F | SYSTOLIC BLOOD PRESSURE: 127 MMHG | HEART RATE: 54 BPM

## 2021-05-27 DIAGNOSIS — S60.944A SUPERFICIAL INJURY OF RIGHT RING FINGER WITH INFECTION: ICD-10-CM

## 2021-05-27 DIAGNOSIS — L08.9 SUPERFICIAL INJURY OF RIGHT RING FINGER WITH INFECTION: ICD-10-CM

## 2021-05-27 DIAGNOSIS — E03.9 HYPOTHYROIDISM, UNSPECIFIED TYPE: ICD-10-CM

## 2021-05-27 DIAGNOSIS — M54.50 MIDLINE LOW BACK PAIN WITHOUT SCIATICA, UNSPECIFIED CHRONICITY: Primary | ICD-10-CM

## 2021-05-27 PROCEDURE — 99214 OFFICE O/P EST MOD 30 MIN: CPT | Performed by: FAMILY MEDICINE

## 2021-05-27 RX ORDER — METHYLPREDNISOLONE 4 MG/1
TABLET ORAL
Qty: 21 TABLET | Refills: 0 | Status: SHIPPED | OUTPATIENT
Start: 2021-05-27 | End: 2021-09-21

## 2021-05-27 RX ORDER — SULFAMETHOXAZOLE AND TRIMETHOPRIM 800; 160 MG/1; MG/1
1 TABLET ORAL 2 TIMES DAILY
Qty: 14 TABLET | Refills: 0 | Status: SHIPPED | OUTPATIENT
Start: 2021-05-27 | End: 2021-06-03

## 2021-05-27 NOTE — PROGRESS NOTES
Subjective   Catherine Nicholas is a 45 y.o. female.     .  Patient presents with complaint of lower back pain. The incident occurred more than 1 -2 weeks ago. The patient state back pain comes  every month before peroids start but this time pain is worse affecting on daily living of activities. The quality of the pain is described as aching and shooting. The pain is at a severity of 8/10. The pain is moderate. Associated symptoms include numbness and tingling. Pertinent negatives include no loss of sensation or muscle weakness. The symptoms are aggravated by movement. She has tried acetaminophen for the symptoms. The treatment provided no relief.     She also C/O injury to right ring finger few days ago when wood piece cut the finger which she got out. She noticed redness and sore with discharge. She denies no fever.         The following portions of the patient's history were reviewed and updated as appropriate: past medical history, past social history, past surgical history and problem list.    Review of Systems   Constitutional: Negative for fatigue.   Respiratory: Negative for cough, shortness of breath and wheezing.    Cardiovascular: Negative for chest pain, palpitations and leg swelling.   Gastrointestinal: Negative for abdominal pain.   Endocrine: Negative for cold intolerance, polyphagia and polyuria.   Musculoskeletal: Positive for back pain.   Skin: Positive for skin lesions.        Right ring finger pain and swelling   Neurological: Negative for dizziness and headache.   Psychiatric/Behavioral: Negative for depressed mood. The patient is not nervous/anxious.        Objective   Physical Exam  Vitals reviewed.   Constitutional:       Appearance: She is well-developed.   Eyes:      Pupils: Pupils are equal, round, and reactive to light.   Neck:      Thyroid: No thyromegaly.   Cardiovascular:      Heart sounds: Normal heart sounds.   Pulmonary:      Effort: Pulmonary effort is normal.      Breath sounds: Normal  breath sounds. No wheezing.   Chest:      Chest wall: No tenderness.   Abdominal:      General: Bowel sounds are normal.      Palpations: Abdomen is soft.   Musculoskeletal:         General: No tenderness. Normal range of motion.      Cervical back: Normal range of motion and neck supple. No tenderness.      Comments: Right ring finger no redness, no foreign body noted.   Skin:     Comments: Right ring finger swelling and  tenderness but no redness and discharge.   Neurological:      Mental Status: She is alert and oriented to person, place, and time.   Psychiatric:         Mood and Affect: Mood normal.       Vitals:    05/27/21 1345   BP: 127/85   Pulse: 54   Temp: 97.3 °F (36.3 °C)   SpO2: 98%     Current Outpatient Medications on File Prior to Visit   Medication Sig Dispense Refill   • levothyroxine (SYNTHROID, LEVOTHROID) 100 MCG tablet Take 1 tablet by mouth Daily. 90 tablet 3     No current facility-administered medications on file prior to visit.               Assessment/Plan   Problems Addressed this Visit        Endocrine and Metabolic    Hypothyroidism     Stable on levothyroxine         Relevant Medications    methylPREDNISolone (MEDROL) 4 MG dose pack       Musculoskeletal and Injuries    Midline low back pain without sciatica - Primary     Advised Tylenol/ NSAIDs and exercise.  Rx Medrol Dose sudeep take as directed.  We will check back x-ray.         Relevant Orders    XR Spine Lumbar 2 or 3 View    Superficial injury of right ring finger with infection     Rx Bactrim DS take  as directed.           Diagnoses       Codes Comments    Midline low back pain without sciatica, unspecified chronicity    -  Primary ICD-10-CM: M54.5  ICD-9-CM: 724.2     Superficial injury of right ring finger with infection     ICD-10-CM: S60.944A, L08.9  ICD-9-CM: 915.9     Hypothyroidism, unspecified type     ICD-10-CM: E03.9  ICD-9-CM: 244.9

## 2021-05-28 PROBLEM — M54.50 MIDLINE LOW BACK PAIN WITHOUT SCIATICA: Status: ACTIVE | Noted: 2021-05-28

## 2021-05-28 PROBLEM — S60.944A: Status: ACTIVE | Noted: 2021-05-28

## 2021-05-28 PROBLEM — L08.9: Status: ACTIVE | Noted: 2021-05-28

## 2021-07-23 ENCOUNTER — TELEPHONE (OUTPATIENT)
Dept: FAMILY MEDICINE CLINIC | Facility: CLINIC | Age: 46
End: 2021-07-23

## 2021-07-23 RX ORDER — LEVOTHYROXINE SODIUM 0.1 MG/1
100 TABLET ORAL DAILY
Qty: 14 TABLET | Refills: 0 | Status: SHIPPED | OUTPATIENT
Start: 2021-07-23 | End: 2022-04-06

## 2021-07-23 NOTE — TELEPHONE ENCOUNTER
Caller: Catherine Nicholas    Relationship: Self    Best call back number: [phone]    Medication needed:   Requested Prescriptions     Pending Prescriptions Disp Refills   • levothyroxine (SYNTHROID, LEVOTHROID) 100 MCG tablet 90 tablet 3     Sig: Take 1 tablet by mouth Daily.       When do you need the refill by: 7/23/21    What additional details did the patient provide when requesting the medication: PATIENT WENT ON VACATION TO [place] AND FORGOT HER LEVOTHYROXINE. SHE ASKED IF SHE COULD GET AN EMERGENCY SUPPLY OF 10 PILLS SENT DOWN TO HER.     Does the patient have less than a 3 day supply:  [x] Yes  [] No    What is the patient's preferred pharmacy: [id number] [address] [phone] [facility] [phone]

## 2021-09-21 ENCOUNTER — LAB (OUTPATIENT)
Dept: FAMILY MEDICINE CLINIC | Facility: CLINIC | Age: 46
End: 2021-09-21

## 2021-09-21 ENCOUNTER — OFFICE VISIT (OUTPATIENT)
Dept: FAMILY MEDICINE CLINIC | Facility: CLINIC | Age: 46
End: 2021-09-21

## 2021-09-21 VITALS
HEART RATE: 54 BPM | HEIGHT: 64 IN | BODY MASS INDEX: 42 KG/M2 | SYSTOLIC BLOOD PRESSURE: 95 MMHG | DIASTOLIC BLOOD PRESSURE: 66 MMHG | TEMPERATURE: 97.5 F | OXYGEN SATURATION: 97 % | WEIGHT: 246 LBS

## 2021-09-21 DIAGNOSIS — R00.1 BRADYCARDIA: ICD-10-CM

## 2021-09-21 DIAGNOSIS — M54.2 NECK PAIN: Primary | ICD-10-CM

## 2021-09-21 DIAGNOSIS — R53.83 FATIGUE, UNSPECIFIED TYPE: ICD-10-CM

## 2021-09-21 DIAGNOSIS — E03.9 HYPOTHYROIDISM, UNSPECIFIED TYPE: ICD-10-CM

## 2021-09-21 LAB
ANION GAP SERPL CALCULATED.3IONS-SCNC: 8.9 MMOL/L (ref 5–15)
BASOPHILS # BLD AUTO: 0.03 10*3/MM3 (ref 0–0.2)
BASOPHILS NFR BLD AUTO: 0.5 % (ref 0–1.5)
BUN SERPL-MCNC: 18 MG/DL (ref 6–20)
BUN/CREAT SERPL: 22.5 (ref 7–25)
CALCIUM SPEC-SCNC: 9.5 MG/DL (ref 8.6–10.5)
CHLORIDE SERPL-SCNC: 103 MMOL/L (ref 98–107)
CHOLEST SERPL-MCNC: 175 MG/DL (ref 0–200)
CO2 SERPL-SCNC: 27.1 MMOL/L (ref 22–29)
CREAT SERPL-MCNC: 0.8 MG/DL (ref 0.57–1)
DEPRECATED RDW RBC AUTO: 42.2 FL (ref 37–54)
EOSINOPHIL # BLD AUTO: 0.14 10*3/MM3 (ref 0–0.4)
EOSINOPHIL NFR BLD AUTO: 2.2 % (ref 0.3–6.2)
ERYTHROCYTE [DISTWIDTH] IN BLOOD BY AUTOMATED COUNT: 13.3 % (ref 12.3–15.4)
GFR SERPL CREATININE-BSD FRML MDRD: 77 ML/MIN/1.73
GFR SERPL CREATININE-BSD FRML MDRD: 93 ML/MIN/1.73
GLUCOSE SERPL-MCNC: 89 MG/DL (ref 65–99)
HCT VFR BLD AUTO: 41.2 % (ref 34–46.6)
HDLC SERPL-MCNC: 54 MG/DL (ref 40–60)
HGB BLD-MCNC: 13.6 G/DL (ref 12–15.9)
IMM GRANULOCYTES # BLD AUTO: 0.01 10*3/MM3 (ref 0–0.05)
IMM GRANULOCYTES NFR BLD AUTO: 0.2 % (ref 0–0.5)
LDLC SERPL CALC-MCNC: 106 MG/DL (ref 0–100)
LDLC/HDLC SERPL: 1.95 {RATIO}
LYMPHOCYTES # BLD AUTO: 2.23 10*3/MM3 (ref 0.7–3.1)
LYMPHOCYTES NFR BLD AUTO: 34.4 % (ref 19.6–45.3)
MCH RBC QN AUTO: 28.9 PG (ref 26.6–33)
MCHC RBC AUTO-ENTMCNC: 33 G/DL (ref 31.5–35.7)
MCV RBC AUTO: 87.5 FL (ref 79–97)
MONOCYTES # BLD AUTO: 0.33 10*3/MM3 (ref 0.1–0.9)
MONOCYTES NFR BLD AUTO: 5.1 % (ref 5–12)
NEUTROPHILS NFR BLD AUTO: 3.74 10*3/MM3 (ref 1.7–7)
NEUTROPHILS NFR BLD AUTO: 57.6 % (ref 42.7–76)
NRBC BLD AUTO-RTO: 0 /100 WBC (ref 0–0.2)
PLATELET # BLD AUTO: 328 10*3/MM3 (ref 140–450)
PMV BLD AUTO: 9.6 FL (ref 6–12)
POTASSIUM SERPL-SCNC: 4.3 MMOL/L (ref 3.5–5.2)
RBC # BLD AUTO: 4.71 10*6/MM3 (ref 3.77–5.28)
SODIUM SERPL-SCNC: 139 MMOL/L (ref 136–145)
TRIGL SERPL-MCNC: 79 MG/DL (ref 0–150)
TSH SERPL DL<=0.05 MIU/L-ACNC: 3.43 UIU/ML (ref 0.27–4.2)
VLDLC SERPL-MCNC: 15 MG/DL (ref 5–40)
WBC # BLD AUTO: 6.48 10*3/MM3 (ref 3.4–10.8)

## 2021-09-21 PROCEDURE — 80061 LIPID PANEL: CPT | Performed by: FAMILY MEDICINE

## 2021-09-21 PROCEDURE — 99214 OFFICE O/P EST MOD 30 MIN: CPT | Performed by: FAMILY MEDICINE

## 2021-09-21 PROCEDURE — 85025 COMPLETE CBC W/AUTO DIFF WBC: CPT | Performed by: FAMILY MEDICINE

## 2021-09-21 PROCEDURE — 80048 BASIC METABOLIC PNL TOTAL CA: CPT | Performed by: FAMILY MEDICINE

## 2021-09-21 PROCEDURE — 84443 ASSAY THYROID STIM HORMONE: CPT | Performed by: FAMILY MEDICINE

## 2021-09-21 PROCEDURE — 36415 COLL VENOUS BLD VENIPUNCTURE: CPT

## 2021-09-21 RX ORDER — BACLOFEN 10 MG/1
10 TABLET ORAL NIGHTLY PRN
Qty: 20 TABLET | Refills: 0 | Status: SHIPPED | OUTPATIENT
Start: 2021-09-21 | End: 2021-10-15

## 2021-09-21 NOTE — PROGRESS NOTES
Subjective   Catherine Nicholas is a 46 y.o. female.     Neck Pain   This is a new problem. The current episode started 1 to 4 weeks ago. The problem occurs intermittently. The problem has been waxing and waning. The pain is present in the occipital region. The pain is at a severity of 4/10. The pain is mild. The symptoms are aggravated by bending. Associated symptoms include headaches. Pertinent negatives include no chest pain, fever, numbness, pain with swallowing, syncope, tingling, trouble swallowing, visual change or weakness. She has tried NSAIDs and heat (message) for the symptoms. The treatment provided mild relief.   Hypothyroidism  The patient also  presents with  f/u on  hypothyroidism.  She complains of fatigue but denies chest pain, shortness of breath, trouble swallowing, anxiety, nervousness, dry skin and hair loss.  Since the last visit, the patient states she is taking medication as directed.  Also low pulse and low blood pressure noted today, she denies dizziness and syncope.       The following portions of the patient's history were reviewed and updated as appropriate: past medical history, past social history, past surgical history and problem list.    Review of Systems   Constitutional: Positive for fatigue. Negative for fever.   HENT: Negative for trouble swallowing.    Respiratory: Negative for shortness of breath and wheezing.    Cardiovascular: Negative for chest pain, palpitations and syncope.   Gastrointestinal: Negative for nausea and vomiting.   Endocrine: Negative for cold intolerance and heat intolerance.   Musculoskeletal: Positive for neck pain.   Neurological: Positive for headache. Negative for dizziness, tingling, weakness and numbness.   Psychiatric/Behavioral: Negative for sleep disturbance. The patient is not nervous/anxious.        Objective   Physical Exam  Vitals reviewed.   Constitutional:       Appearance: She is well-developed.   Neck:      Thyroid: No thyromegaly.    Cardiovascular:      Rate and Rhythm: Normal rate.      Pulses: Normal pulses.   Pulmonary:      Breath sounds: Normal breath sounds. No wheezing.   Abdominal:      General: Bowel sounds are normal.      Palpations: Abdomen is soft.      Tenderness: There is no abdominal tenderness.   Musculoskeletal:      Cervical back: Neck supple. Pain with movement present. Decreased range of motion.   Neurological:      Mental Status: She is alert and oriented to person, place, and time.   Psychiatric:         Mood and Affect: Mood normal.       Vitals:    09/21/21 0910   BP: 95/66   Pulse: 54   Temp: 97.5 °F (36.4 °C)   SpO2: 97%     Current Outpatient Medications on File Prior to Visit   Medication Sig Dispense Refill   • levothyroxine (SYNTHROID, LEVOTHROID) 100 MCG tablet Take 1 tablet by mouth Daily. 14 tablet 0     No current facility-administered medications on file prior to visit.           Assessment/Plan   Problems Addressed this Visit        Cardiac and Vasculature    Bradycardia    Relevant Orders    Ambulatory Referral to Cardiology    Basic metabolic panel (Completed)    CBC w AUTO Differential (Completed)       Endocrine and Metabolic    Hypothyroidism    Relevant Orders    Lipid panel (Completed)    Basic metabolic panel (Completed)    TSH (Completed)    CBC w AUTO Differential (Completed)       Musculoskeletal and Injuries    Neck pain - Primary     Discussed symptom management, NSAIDs, muscle relaxant and  Exercise.            Symptoms and Signs    Fatigue    Relevant Orders    Ambulatory Referral to Cardiology    Lipid panel (Completed)    Basic metabolic panel (Completed)    TSH (Completed)    CBC w AUTO Differential (Completed)      Diagnoses       Codes Comments    Neck pain    -  Primary ICD-10-CM: M54.2  ICD-9-CM: 723.1     Bradycardia     ICD-10-CM: R00.1  ICD-9-CM: 427.89     Fatigue, unspecified type     ICD-10-CM: R53.83  ICD-9-CM: 780.79     Hypothyroidism, unspecified type     ICD-10-CM:  E03.9  ICD-9-CM: 244.9

## 2021-10-15 ENCOUNTER — OFFICE VISIT (OUTPATIENT)
Dept: CARDIOLOGY | Facility: CLINIC | Age: 46
End: 2021-10-15

## 2021-10-15 VITALS
HEIGHT: 64 IN | SYSTOLIC BLOOD PRESSURE: 105 MMHG | HEART RATE: 54 BPM | DIASTOLIC BLOOD PRESSURE: 71 MMHG | OXYGEN SATURATION: 98 % | BODY MASS INDEX: 40.63 KG/M2 | WEIGHT: 238 LBS

## 2021-10-15 DIAGNOSIS — R53.83 FATIGUE, UNSPECIFIED TYPE: ICD-10-CM

## 2021-10-15 DIAGNOSIS — R06.09 DYSPNEA ON EXERTION: ICD-10-CM

## 2021-10-15 DIAGNOSIS — E66.01 MORBID OBESITY WITH BMI OF 40.0-44.9, ADULT (HCC): ICD-10-CM

## 2021-10-15 DIAGNOSIS — R00.2 PALPITATIONS: Primary | ICD-10-CM

## 2021-10-15 DIAGNOSIS — M54.2 NECK PAIN: ICD-10-CM

## 2021-10-15 PROCEDURE — 99204 OFFICE O/P NEW MOD 45 MIN: CPT | Performed by: INTERNAL MEDICINE

## 2021-10-15 PROCEDURE — 93000 ELECTROCARDIOGRAM COMPLETE: CPT | Performed by: INTERNAL MEDICINE

## 2021-10-15 NOTE — PROGRESS NOTES
Cardiology Consult Note    Patient Identification:  Name: Catherine Nicholas  Age: 46 y.o.  Sex: female  :  1975  MRN: [id number]             Requesting Physician :  Gilberto Manzanares MD      Reason for Consultation / Chief Complaint : Bradycardia, palpitations    History of Present Illness:      This is a 46-year-old with PMH of    Morbid obesity BMI over 42  Hypothyroidism, on levothyroxine therapy  Former smoker  Tubal ligation  Family history negative for premature CAD, NKDA    Here for evaluation of palpitations, fatigue, neck pain, dyspnea on exertion.  Patient was found to have bradycardia.  Patient 3 weeks ago was feeling bad pain in the back of the neck now feels a little better and is feeling tired and her Apple Watch showed a heart rate of 42 bpm.  Review of records reveal labs from 2021 revealed normal TSH, BMP, cholesterol 175, triglycerides 179, HDL 54, .      Assessment:  :    Palpitations  Neck pain  Fatigue  Dyspnea on exertion  Obesity with BMI of over 42  Hypothyroidism      Recommendations / Plan:        Reviewed EKG results with patient.  We will check a Holter and echocardiogram to assess patient's symptoms of palpitations, dyspnea on exertion fatigue, neck pain and bradycardia.  Reviewed BMI over 40 counseled on weight loss diet and exercise.  Follow-up with PMD for hypothyroidism.  We will follow up and consider further evaluation treatment.               Diagnosis Plan   1. Palpitations  Adult Transthoracic Echo Complete W/ Cont if Necessary Per Protocol    Holter Monitor - 72 Hour Up To 15 Days   2. Neck pain  Adult Transthoracic Echo Complete W/ Cont if Necessary Per Protocol    Holter Monitor - 72 Hour Up To 15 Days   3. Fatigue, unspecified type  Adult Transthoracic Echo Complete W/ Cont if Necessary Per Protocol    Holter Monitor - 72 Hour Up To 15 Days   4. Morbid obesity with BMI of 40.0-44.9, adult (HCC)  Adult Transthoracic Echo Complete W/ Cont if Necessary Per  "Protocol    Holter Monitor - 72 Hour Up To 15 Days   5. Dyspnea on exertion  Adult Transthoracic Echo Complete W/ Cont if Necessary Per Protocol    Holter Monitor - 72 Hour Up To 15 Days              Past Medical History:  Past Medical History:   Diagnosis Date   • Bradycardia    • Hypothyroidism      Past Surgical History:  History reviewed. No pertinent surgical history.   Allergies:  No Known Allergies  Home Meds:  Current Meds:     Current Outpatient Medications:   •  levothyroxine (SYNTHROID, LEVOTHROID) 100 MCG tablet, Take 1 tablet by mouth Daily., Disp: 14 tablet, Rfl: 0  Social History:   Social History     Tobacco Use   • Smoking status: Former Smoker   • Smokeless tobacco: Never Used   Substance Use Topics   • Alcohol use: No      Family History:  Family History   Problem Relation Age of Onset   • No Known Problems Mother    • No Known Problems Father         Review of Systems : Review of Systems   Cardiovascular: Positive for palpitations. Negative for chest pain and leg swelling.   Respiratory: Negative for shortness of breath.    Neurological: Negative for dizziness and numbness.               Constitutional:       Physical Exam   /71 (BP Location: Left arm, Patient Position: Sitting, Cuff Size: Large Adult)   Pulse 54   Ht 162.6 cm (64\")   Wt 108 kg (238 lb)   SpO2 98%   BMI 40.85 kg/m²   Physical Exam  General:  Appears in no acute distress, pleasant obese  Eyes: Sclera is anicteric,  conjunctiva is clear   HEENT:  No JVD. Thyroid not visibly enlarged. No mucosal pallor or cyanosis  Respiratory: Respirations regular and unlabored at rest.  Bilaterally good breath sounds, with good air entry in all fields. No crackles, rubs or wheezes auscultated  Cardiovascular: S1,S2 Regular rate and rhythm. No murmur, rub or gallop auscultated. No pretibial pitting edema  Gastrointestinal: Abdomen soft, flat, non tender. Bowel sounds present.   Musculoskeletal:  No abnormal movements  Extremities: No " digital clubbing or cyanosis  Skin: Color pink. Skin warm and dry to touch. No rashes  No xanthoma  Neuro: Alert and awake, no lateralizing deficits appreciated    Cardiographics  ECG: EKG tracing was  personally reviewed/interpreted by me    ECG 12 Lead    Date/Time: 10/15/2021 12:30 PM  Performed by: Isaias Chau MD  Authorized by: Isaias Chau MD   Comparison: not compared with previous ECG   Previous ECG: no previous ECG available  Comments: EKG done today reviewed/interpreted by me reveals sinus bradycardia with rate of 47 bpm with no acute ST-T changes, no comparison EKG available.              Echocardiogram:       Imaging  Chest X-ray:   Imaging Results (Last 24 Hours)     ** No results found for the last 24 hours. **          Lab Review: I have reviewed the labs              @[email or web address]@                  Isaias Chau MD  10/28/2021, 12:27 EDT      EMR Dragon/Transcription:   Dictated utilizing Dragon dictation   Quality 154 Part A: Falls: Risk Assessment (Should Be Reported With Measure 155.): Falls risk assessment completed and documented in the past 12 months. Quality 265: Biopsy Follow-Up: Biopsy results reviewed, communicated, tracked, and documented Quality 154 Part B: Falls: Risk Screening (Should Be Reported With Measure 155.): Patient screened for future fall risk; documentation of no falls in the past year or only one fall without injury in the past year Quality 131: Pain Assessment And Follow-Up: Pain assessment using a standardized tool is documented as negative, no follow-up plan required Quality 47: Advance Care Plan: Advance Care Planning discussed and documented in the medical record; patient did not wish or was not able to name a surrogate decision maker or provide an advance care plan. Quality 155 (Denominator): Falls Plan Of Care: Plan of Care not Documented, Reason not Otherwise Specified Quality 111:Pneumonia Vaccination Status For Older Adults: Pneumococcal Vaccination Previously Received Detail Level: Detailed Quality 110: Preventive Care And Screening: Influenza Immunization: Influenza Immunization Administered during Influenza season Quality 226: Preventive Care And Screening: Tobacco Use: Screening And Cessation Intervention: Patient screened for tobacco and never smoked Quality 431: Preventive Care And Screening: Unhealthy Alcohol Use - Screening: Patient screened for unhealthy alcohol use using a single question and scores less than 2 times per year

## 2021-10-26 ENCOUNTER — HOSPITAL ENCOUNTER (OUTPATIENT)
Dept: CARDIOLOGY | Facility: HOSPITAL | Age: 46
Discharge: HOME | End: 2021-10-26
Admitting: INTERNAL MEDICINE

## 2021-10-26 VITALS
BODY MASS INDEX: 41.83 KG/M2 | HEIGHT: 64 IN | WEIGHT: 245 LBS | SYSTOLIC BLOOD PRESSURE: 102 MMHG | DIASTOLIC BLOOD PRESSURE: 54 MMHG

## 2021-10-26 DIAGNOSIS — R06.09 DYSPNEA ON EXERTION: ICD-10-CM

## 2021-10-26 DIAGNOSIS — M54.2 NECK PAIN: ICD-10-CM

## 2021-10-26 DIAGNOSIS — R00.2 PALPITATIONS: ICD-10-CM

## 2021-10-26 DIAGNOSIS — E66.01 MORBID OBESITY WITH BMI OF 40.0-44.9, ADULT (HCC): ICD-10-CM

## 2021-10-26 DIAGNOSIS — R53.83 FATIGUE, UNSPECIFIED TYPE: ICD-10-CM

## 2021-10-26 PROCEDURE — 93306 TTE W/DOPPLER COMPLETE: CPT | Performed by: INTERNAL MEDICINE

## 2021-10-26 PROCEDURE — 93306 TTE W/DOPPLER COMPLETE: CPT

## 2021-10-28 LAB
BH CV ECHO MEAS - ACS: 1.7 CM
BH CV ECHO MEAS - AI DEC SLOPE: 113.6 CM/SEC^2
BH CV ECHO MEAS - AI DEC TIME: 4.2 SEC
BH CV ECHO MEAS - AI MAX PG: 92.3 MMHG
BH CV ECHO MEAS - AI MAX VEL: 480.4 CM/SEC
BH CV ECHO MEAS - AI P1/2T: 1239 MSEC
BH CV ECHO MEAS - AO MAX PG (FULL): 6.1 MMHG
BH CV ECHO MEAS - AO MAX PG: 10.5 MMHG
BH CV ECHO MEAS - AO MEAN PG (FULL): 3.6 MMHG
BH CV ECHO MEAS - AO MEAN PG: 5.5 MMHG
BH CV ECHO MEAS - AO ROOT AREA (BSA CORRECTED): 1.2
BH CV ECHO MEAS - AO ROOT AREA: 5.1 CM^2
BH CV ECHO MEAS - AO ROOT DIAM: 2.6 CM
BH CV ECHO MEAS - AO V2 MAX: 161.9 CM/SEC
BH CV ECHO MEAS - AO V2 MEAN: 110.6 CM/SEC
BH CV ECHO MEAS - AO V2 VTI: 37.5 CM
BH CV ECHO MEAS - ASC AORTA: 2.5 CM
BH CV ECHO MEAS - AVA(I,A): 1.5 CM^2
BH CV ECHO MEAS - AVA(I,D): 1.5 CM^2
BH CV ECHO MEAS - AVA(V,A): 1.7 CM^2
BH CV ECHO MEAS - AVA(V,D): 1.7 CM^2
BH CV ECHO MEAS - BSA(HAYCOCK): 2.3 M^2
BH CV ECHO MEAS - BSA: 2.1 M^2
BH CV ECHO MEAS - BZI_BMI: 42.1 KILOGRAMS/M^2
BH CV ECHO MEAS - BZI_METRIC_HEIGHT: 162.6 CM
BH CV ECHO MEAS - BZI_METRIC_WEIGHT: 111.1 KG
BH CV ECHO MEAS - EDV(CUBED): 148.1 ML
BH CV ECHO MEAS - EDV(MOD-SP2): 87.5 ML
BH CV ECHO MEAS - EDV(MOD-SP4): 97.8 ML
BH CV ECHO MEAS - EDV(TEICH): 134.8 ML
BH CV ECHO MEAS - EF(CUBED): 68.8 %
BH CV ECHO MEAS - EF(MOD-SP2): 81 %
BH CV ECHO MEAS - EF(MOD-SP4): 72.3 %
BH CV ECHO MEAS - EF(TEICH): 59.9 %
BH CV ECHO MEAS - ESV(CUBED): 46.2 ML
BH CV ECHO MEAS - ESV(MOD-SP2): 16.7 ML
BH CV ECHO MEAS - ESV(MOD-SP4): 27.1 ML
BH CV ECHO MEAS - ESV(TEICH): 54 ML
BH CV ECHO MEAS - FS: 32.2 %
BH CV ECHO MEAS - IVS/LVPW: 0.91
BH CV ECHO MEAS - IVSD: 0.71 CM
BH CV ECHO MEAS - LA DIMENSION: 4.3 CM
BH CV ECHO MEAS - LA/AO: 1.7
BH CV ECHO MEAS - LV DIASTOLIC VOL/BSA (35-75): 45.9 ML/M^2
BH CV ECHO MEAS - LV MASS(C)D: 135.6 GRAMS
BH CV ECHO MEAS - LV MASS(C)DI: 63.6 GRAMS/M^2
BH CV ECHO MEAS - LV MAX PG: 4.4 MMHG
BH CV ECHO MEAS - LV MEAN PG: 1.9 MMHG
BH CV ECHO MEAS - LV SYSTOLIC VOL/BSA (12-30): 12.7 ML/M^2
BH CV ECHO MEAS - LV V1 MAX: 104.5 CM/SEC
BH CV ECHO MEAS - LV V1 MEAN: 63.7 CM/SEC
BH CV ECHO MEAS - LV V1 VTI: 22.5 CM
BH CV ECHO MEAS - LVIDD: 5.3 CM
BH CV ECHO MEAS - LVIDS: 3.6 CM
BH CV ECHO MEAS - LVOT AREA: 2.6 CM^2
BH CV ECHO MEAS - LVOT DIAM: 1.8 CM
BH CV ECHO MEAS - LVPWD: 0.77 CM
BH CV ECHO MEAS - MV A MAX VEL: 58.3 CM/SEC
BH CV ECHO MEAS - MV DEC SLOPE: 422.5 CM/SEC^2
BH CV ECHO MEAS - MV DEC TIME: 0.18 SEC
BH CV ECHO MEAS - MV E MAX VEL: 76.1 CM/SEC
BH CV ECHO MEAS - MV E/A: 1.3
BH CV ECHO MEAS - MV MAX PG: 3.4 MMHG
BH CV ECHO MEAS - MV MEAN PG: 1.2 MMHG
BH CV ECHO MEAS - MV V2 MAX: 92.5 CM/SEC
BH CV ECHO MEAS - MV V2 MEAN: 50.9 CM/SEC
BH CV ECHO MEAS - MV V2 VTI: 33.6 CM
BH CV ECHO MEAS - MVA(VTI): 1.7 CM^2
BH CV ECHO MEAS - PA ACC TIME: 0.11 SEC
BH CV ECHO MEAS - PA PR(ACCEL): 31.2 MMHG
BH CV ECHO MEAS - PULM A REVS DUR: 0.09 SEC
BH CV ECHO MEAS - PULM A REVS VEL: 25.2 CM/SEC
BH CV ECHO MEAS - PULM DIAS VEL: 55.9 CM/SEC
BH CV ECHO MEAS - PULM S/D: 0.77
BH CV ECHO MEAS - PULM SYS VEL: 43.1 CM/SEC
BH CV ECHO MEAS - RAP SYSTOLE: 3 MMHG
BH CV ECHO MEAS - RV MAX PG: 2 MMHG
BH CV ECHO MEAS - RV MEAN PG: 1 MMHG
BH CV ECHO MEAS - RV V1 MAX: 70 CM/SEC
BH CV ECHO MEAS - RV V1 MEAN: 47.3 CM/SEC
BH CV ECHO MEAS - RV V1 VTI: 17.3 CM
BH CV ECHO MEAS - RVDD: 2.4 CM
BH CV ECHO MEAS - RVSP: 22 MMHG
BH CV ECHO MEAS - SI(AO): 90.5 ML/M^2
BH CV ECHO MEAS - SI(CUBED): 47.8 ML/M^2
BH CV ECHO MEAS - SI(LVOT): 27 ML/M^2
BH CV ECHO MEAS - SI(MOD-SP2): 33.2 ML/M^2
BH CV ECHO MEAS - SI(MOD-SP4): 33.2 ML/M^2
BH CV ECHO MEAS - SI(TEICH): 37.9 ML/M^2
BH CV ECHO MEAS - SV(AO): 193.1 ML
BH CV ECHO MEAS - SV(CUBED): 101.9 ML
BH CV ECHO MEAS - SV(LVOT): 57.5 ML
BH CV ECHO MEAS - SV(MOD-SP2): 70.8 ML
BH CV ECHO MEAS - SV(MOD-SP4): 70.7 ML
BH CV ECHO MEAS - SV(TEICH): 80.8 ML
BH CV ECHO MEAS - TR MAX VEL: 218.1 CM/SEC
MAXIMAL PREDICTED HEART RATE: 173 BPM
STRESS TARGET HR: 147 BPM

## 2021-11-09 ENCOUNTER — TELEPHONE (OUTPATIENT)
Dept: CARDIOLOGY | Facility: CLINIC | Age: 46
End: 2021-11-09

## 2021-12-13 ENCOUNTER — OFFICE VISIT (OUTPATIENT)
Dept: FAMILY MEDICINE CLINIC | Facility: CLINIC | Age: 46
End: 2021-12-13

## 2021-12-13 VITALS
OXYGEN SATURATION: 98 % | DIASTOLIC BLOOD PRESSURE: 71 MMHG | HEART RATE: 50 BPM | SYSTOLIC BLOOD PRESSURE: 117 MMHG | TEMPERATURE: 97.5 F | HEIGHT: 64 IN | BODY MASS INDEX: 42.55 KG/M2 | WEIGHT: 249.2 LBS

## 2021-12-13 DIAGNOSIS — F41.9 ANXIETY: ICD-10-CM

## 2021-12-13 DIAGNOSIS — Z12.11 COLON CANCER SCREENING: ICD-10-CM

## 2021-12-13 DIAGNOSIS — Z12.31 ENCOUNTER FOR SCREENING MAMMOGRAM FOR BREAST CANCER: ICD-10-CM

## 2021-12-13 DIAGNOSIS — Z00.00 ENCOUNTER FOR WELL ADULT EXAM WITHOUT ABNORMAL FINDINGS: Primary | ICD-10-CM

## 2021-12-13 PROCEDURE — 99396 PREV VISIT EST AGE 40-64: CPT | Performed by: FAMILY MEDICINE

## 2021-12-13 RX ORDER — BACLOFEN 10 MG/1
10 TABLET ORAL NIGHTLY PRN
Qty: 20 TABLET | Refills: 0 | Status: SHIPPED | OUTPATIENT
Start: 2021-12-13 | End: 2022-09-20

## 2021-12-13 RX ORDER — BUSPIRONE HYDROCHLORIDE 5 MG/1
5 TABLET ORAL DAILY
Qty: 30 TABLET | Refills: 1 | Status: SHIPPED | OUTPATIENT
Start: 2021-12-13 | End: 2022-02-01 | Stop reason: SINTOL

## 2021-12-13 NOTE — PROGRESS NOTES
Subjective   Catherine Nicholas is a 46 y.o. female.     History of Present Illness     The patient comes in today for annual physical.  The patient  C/O anxiety, neck pain and headache but denies depression,  fatigue, cold intolerance, cough, chest pain, palpitations, dizziness, S.T and SOB. The patient admits to dietary compliance is  fair  and exercising occasionally.  She is a non-smoker.      The following portions of the patient's history were reviewed and updated as appropriate: past medical history, past social history, past surgical history and problem list.    Review of Systems   Constitutional: Negative for fatigue and fever.   HENT: Negative for congestion, sinus pressure, sore throat and trouble swallowing.    Eyes: Negative for blurred vision.   Respiratory: Negative for shortness of breath and wheezing.    Cardiovascular: Negative for chest pain and palpitations.   Gastrointestinal: Negative for abdominal pain, nausea, vomiting and GERD.   Endocrine: Negative for cold intolerance and polyphagia.   Genitourinary: Negative for dysuria.   Musculoskeletal: Positive for neck pain.   Neurological: Positive for headache. Negative for dizziness.   Psychiatric/Behavioral: Negative for sleep disturbance, suicidal ideas, depressed mood and stress. The patient is nervous/anxious.        Objective   Physical Exam  Vitals reviewed.   Constitutional:       General: She is not in acute distress.     Appearance: She is well-developed.   Eyes:      Conjunctiva/sclera: Conjunctivae normal.   Neck:      Thyroid: No thyromegaly.   Cardiovascular:      Rate and Rhythm: Normal rate.      Pulses: Normal pulses.      Heart sounds: Normal heart sounds.   Pulmonary:      Effort: Pulmonary effort is normal.      Breath sounds: Normal breath sounds. No wheezing.   Abdominal:      General: Bowel sounds are normal.      Palpations: Abdomen is soft.      Tenderness: There is no abdominal tenderness.   Musculoskeletal:         General: No  tenderness. Normal range of motion.      Cervical back: Normal range of motion and neck supple. No rigidity or tenderness.   Neurological:      General: No focal deficit present.      Mental Status: She is alert and oriented to person, place, and time.   Psychiatric:         Mood and Affect: Mood normal.         Behavior: Behavior normal.       Vitals:    12/13/21 0936   BP: 117/71   Pulse: 50   Temp: 97.5 °F (36.4 °C)   SpO2: 98%     Current Outpatient Medications on File Prior to Visit   Medication Sig Dispense Refill   • levothyroxine (SYNTHROID, LEVOTHROID) 100 MCG tablet Take 1 tablet by mouth Daily. 14 tablet 0     No current facility-administered medications on file prior to visit.           Assessment/Plan   Problems Addressed this Visit        Gastrointestinal Abdominal     Colon cancer screening    Relevant Orders    Cologuard - Stool, Per Rectum       Genitourinary and Reproductive     Encounter for screening mammogram for breast cancer    Relevant Orders    Mammo Screening Digital Tomosynthesis Bilateral With CAD       Health Encounters    Encounter for well adult exam without abnormal findings - Primary     Discussed healthy diet and  importance of regular exercise and recommend starting or continuing a regular exercise program for good health.  Stressed importance of moderation in sodium/caffeine intake, saturated fat and cholesterol, caloric balance, sufficient intake of fresh fruits, vegetables, fiber, calcium and iron.              Mental Health    Anxiety     Anxiety is unchanged. Rx Buspar. The patient verified not suicidal at this time, she will call our office if there is  any worsening of Sx or thoughts of doing harm arise.               Diagnoses       Codes Comments    Encounter for well adult exam without abnormal findings    -  Primary ICD-10-CM: Z00.00  ICD-9-CM: V70.0     Colon cancer screening     ICD-10-CM: Z12.11  ICD-9-CM: V76.51     Encounter for screening mammogram for breast cancer      ICD-10-CM: Z12.31  ICD-9-CM: V76.12     Anxiety     ICD-10-CM: F41.9  ICD-9-CM: 300.00

## 2021-12-19 PROBLEM — F41.9 ANXIETY: Status: ACTIVE | Noted: 2021-12-19

## 2021-12-19 NOTE — ASSESSMENT & PLAN NOTE
Anxiety is unchanged. Rx Buspar. The patient verified not suicidal at this time, she will call our office if there is  any worsening of Sx or thoughts of doing harm arise.

## 2021-12-22 ENCOUNTER — HOSPITAL ENCOUNTER (OUTPATIENT)
Dept: MAMMOGRAPHY | Facility: HOSPITAL | Age: 46
Discharge: HOME | End: 2021-12-22
Admitting: FAMILY MEDICINE

## 2021-12-22 PROCEDURE — 77063 BREAST TOMOSYNTHESIS BI: CPT

## 2021-12-22 PROCEDURE — 77067 SCR MAMMO BI INCL CAD: CPT

## 2021-12-23 ENCOUNTER — TELEPHONE (OUTPATIENT)
Dept: FAMILY MEDICINE CLINIC | Facility: CLINIC | Age: 46
End: 2021-12-23

## 2022-01-18 ENCOUNTER — OFFICE VISIT (OUTPATIENT)
Dept: CARDIOLOGY | Facility: CLINIC | Age: 47
End: 2022-01-18

## 2022-01-18 VITALS
DIASTOLIC BLOOD PRESSURE: 78 MMHG | SYSTOLIC BLOOD PRESSURE: 115 MMHG | WEIGHT: 248 LBS | HEIGHT: 64 IN | OXYGEN SATURATION: 97 % | HEART RATE: 52 BPM | BODY MASS INDEX: 42.34 KG/M2

## 2022-01-18 DIAGNOSIS — R00.1 BRADYCARDIA: ICD-10-CM

## 2022-01-18 DIAGNOSIS — E66.01 MORBID OBESITY WITH BMI OF 40.0-44.9, ADULT: ICD-10-CM

## 2022-01-18 DIAGNOSIS — R53.83 FATIGUE, UNSPECIFIED TYPE: Primary | ICD-10-CM

## 2022-01-18 PROCEDURE — 99214 OFFICE O/P EST MOD 30 MIN: CPT | Performed by: INTERNAL MEDICINE

## 2022-01-18 NOTE — PROGRESS NOTES
Subjective:     Encounter Date:01/18/2022      Patient ID: Catherine Nicholas is a 46 y.o. female.    Chief Complaint : Follow-up for bradycardia, palpitations, obesity with BMI over 40  History of Present Illness         Ms. Catherine Nicholas has PMH of    Bradycardia  Morbid obesity BMI over 42  Hypothyroidism, on levothyroxine therapy  Former smoker  Tubal ligation  Family history negative for premature CAD, NKDA    Here for follow-up.  Patient is complaining of feeling tired all the time  With no aggravating or relieving factors.  Patient was seen for evaluation of palpitations fatigue and neck pain and dyspnea on exertion was found to be bradycardic.  Work-up revealed Holter monitor 10/15/2021 with heart rates from 37-89 mean of 55 bpm.  Echo 10/28/2021 revealed EF of 60 to 60% with PA systolic pressure of 20 mmHg.    Review of records reveal labs from 9/21/2021 revealed normal TSH, BMP, cholesterol 175, triglycerides 179, HDL 54, .      Assessment:  :    Fatigue  Bradycardia  Pulmonary hypertension  Dyspnea on exertion  Obesity with BMI of over 42  Hypothyroidism      Recommendations / Plan:        Reviewed echo results with patient.  Patient has obesity and  will benefit from sleep apnea evaluation since most of her bradycardia episodes are in her sleep..  Reviewed BMI over 40, counseled on weight loss diet and exercise.  Follow-up with PMD for hypothyroidism.  We will follow up and consider further evaluation treatment.  Discussed about COVID-19 vaccination patient took 2 doses of Pfizer advised her to take booster.      Procedures event monitor 10/27/2021 revealed heart rate of 37 bpm and RCA.    The following portions of the patient's history were reviewed and updated as appropriate: allergies, current medications, past family history, past medical history, past social history, past surgical history and problem list.    Assessment:         [facility]     Diagnosis Plan   1. Fatigue, unspecified type  Ambulatory  "Referral to Sleep Medicine   2. Morbid obesity with BMI of 40.0-44.9, adult ([facility])  Ambulatory Referral to Sleep Medicine   3. Bradycardia  Ambulatory Referral to Sleep Medicine          Plan:               Past Medical History:  Past Medical History:   Diagnosis Date   • Bradycardia    • Hypothyroidism    • Palpitation      Past Surgical History:  Past Surgical History:   Procedure Laterality Date   • BREAST BIOPSY        Allergies:  No Known Allergies  Home Meds:  Current Meds:     Current Outpatient Medications:   •  busPIRone (BUSPAR) 5 MG tablet, Take 1 tablet by mouth Daily., Disp: 30 tablet, Rfl: 1  •  levothyroxine (SYNTHROID, LEVOTHROID) 100 MCG tablet, Take 1 tablet by mouth Daily., Disp: 14 tablet, Rfl: 0  •  baclofen (LIORESAL) 10 MG tablet, Take 1 tablet by mouth At Night As Needed for Muscle Spasms., Disp: 20 tablet, Rfl: 0  Social History:   Social History     Tobacco Use   • Smoking status: Former Smoker   • Smokeless tobacco: Never Used   Substance Use Topics   • Alcohol use: No      Family History:  Family History   Problem Relation Age of Onset   • No Known Problems Mother    • No Known Problems Father               Review of Systems   Constitutional: Positive for malaise/fatigue.   Cardiovascular: Negative for chest pain, leg swelling and palpitations.   Respiratory: Positive for shortness of breath.    Skin: Negative for rash.   Neurological: Negative for dizziness, light-headedness and numbness.     All other systems are negative         Objective:     Physical Exam  /78   Pulse 52   Ht 162.6 cm (64\")   Wt 112 kg (248 lb)   SpO2 97%   BMI 42.57 kg/m²   General:  Appears in no acute distress  Eyes: Sclera is anicteric,  conjunctiva is clear   HEENT:  No JVD.  No carotid bruits  Respiratory: Respirations regular and unlabored at rest.  Clear to auscultation  Cardiovascular: S1,S2 Regular rate and rhythm. No murmur, rub or gallop auscultated.   Extremities: No digital clubbing or " cyanosis, no edema  Skin: Color pink. Skin warm and dry to touch. No rashes  No xanthoma  Neuro: Alert and awake, no lateralizing deficits appreciated    Lab Reviewed:         Isaias Chau MD  1/18/2022 12:54 EST      Much of the above report is an electronic transcription/translation of the spoken language to printed text using Dragon Software. As such, the subtleties and finesse of the spoken language may permit erroneous, or at times, nonsensical words or phrases to be inadvertently transcribed; thus changes may be made at a later date to rectify these errors.

## 2022-01-31 ENCOUNTER — TELEPHONE (OUTPATIENT)
Dept: FAMILY MEDICINE CLINIC | Facility: CLINIC | Age: 47
End: 2022-01-31

## 2022-01-31 RX ORDER — BUPROPION HYDROCHLORIDE 150 MG/1
150 TABLET ORAL DAILY
Qty: 30 TABLET | Refills: 1 | Status: SHIPPED | OUTPATIENT
Start: 2022-01-31 | End: 2022-09-20

## 2022-02-01 ENCOUNTER — OFFICE VISIT (OUTPATIENT)
Dept: FAMILY MEDICINE CLINIC | Facility: CLINIC | Age: 47
End: 2022-02-01

## 2022-02-01 VITALS
HEART RATE: 51 BPM | WEIGHT: 251 LBS | SYSTOLIC BLOOD PRESSURE: 120 MMHG | OXYGEN SATURATION: 98 % | HEIGHT: 64 IN | BODY MASS INDEX: 42.85 KG/M2 | DIASTOLIC BLOOD PRESSURE: 84 MMHG | TEMPERATURE: 98 F

## 2022-02-01 DIAGNOSIS — F41.8 MIXED ANXIETY AND DEPRESSIVE DISORDER: Primary | ICD-10-CM

## 2022-02-01 DIAGNOSIS — E03.9 HYPOTHYROIDISM, UNSPECIFIED TYPE: ICD-10-CM

## 2022-02-01 PROBLEM — F41.9 ANXIETY: Status: RESOLVED | Noted: 2021-12-19 | Resolved: 2022-02-01

## 2022-02-01 PROCEDURE — 99213 OFFICE O/P EST LOW 20 MIN: CPT | Performed by: FAMILY MEDICINE

## 2022-02-01 NOTE — PROGRESS NOTES
Subjective   Catherine Nicholas is a 46 y.o. female.     46-year-old female patient present with complaint of anxiety depression.  She has tried BuSpar did not help much for her symptoms also was giving side effects so she has stopped.    Anxiety  Presents for follow-up visit. Symptoms include depressed mood, excessive worry, insomnia and nervous/anxious behavior. Patient reports no chest pain, decreased concentration, dizziness, irritability, nausea, palpitations, panic, shortness of breath or suicidal ideas. Symptoms occur most days. The severity of symptoms is moderate. The quality of sleep is fair.        The patient also  presents with f/u on  hypothyroidism.  She complains of fatigue but denies chest pain, palpitations, shortness of breath, trouble swallowing, anxiety, nervousness, dry skin and hair loss.  Since the last visit, the patient states  she is taking medication as directed      The following portions of the patient's history were reviewed and updated as appropriate: past medical history, past social history, past surgical history and problem list.    Review of Systems   Constitutional: Negative for irritability.   Respiratory: Negative for shortness of breath.    Cardiovascular: Negative for chest pain and palpitations.   Gastrointestinal: Negative for nausea.   Endocrine: Negative for cold intolerance and heat intolerance.   Neurological: Negative for dizziness.   Psychiatric/Behavioral: Positive for depressed mood and stress. Negative for behavioral problems, decreased concentration, sleep disturbance and suicidal ideas. The patient is nervous/anxious and has insomnia.        Objective   Physical Exam  Vitals reviewed.   Constitutional:       General: She is not in acute distress.  Pulmonary:      Effort: Pulmonary effort is normal.      Breath sounds: Normal breath sounds.   Neurological:      Mental Status: She is alert and oriented to person, place, and time.   Psychiatric:         Mood and Affect:  Mood normal.         Behavior: Behavior normal.       Vitals:    02/01/22 1350   BP: 120/84   Pulse: 51   Temp: 98 °F (36.7 °C)   SpO2: 98%     Current Outpatient Medications on File Prior to Visit   Medication Sig Dispense Refill   • baclofen (LIORESAL) 10 MG tablet Take 1 tablet by mouth At Night As Needed for Muscle Spasms. 20 tablet 0   • buPROPion XL (Wellbutrin XL) 150 MG 24 hr tablet Take 1 tablet by mouth Daily. 30 tablet 1   • levothyroxine (SYNTHROID, LEVOTHROID) 100 MCG tablet Take 1 tablet by mouth Daily. 14 tablet 0   • [DISCONTINUED] busPIRone (BUSPAR) 5 MG tablet Take 1 tablet by mouth Daily. 30 tablet 1     No current facility-administered medications on file prior to visit.           Assessment/Plan   Problems Addressed this Visit        Endocrine and Metabolic    Hypothyroidism     TSH normal continue current dose of levothyroxine            Mental Health    Mixed anxiety and depressive disorder - Primary     Patient's depression is recurrent and is mild without psychosis. Their depression is currently active and the condition is unchanged. This will be reassessed 6 weeks. F/U as described:patient was prescribed an antidepressant medicine.           Diagnoses       Codes Comments    Mixed anxiety and depressive disorder    -  Primary ICD-10-CM: F41.8  ICD-9-CM: 300.4     Hypothyroidism, unspecified type     ICD-10-CM: E03.9  ICD-9-CM: 244.9

## 2022-02-01 NOTE — ASSESSMENT & PLAN NOTE
Patient's depression is recurrent and is mild without psychosis. Their depression is currently active and the condition is unchanged. This will be reassessed 6 weeks. F/U as described:patient was prescribed an antidepressant medicine.

## 2022-04-06 RX ORDER — LEVOTHYROXINE SODIUM 0.1 MG/1
100 TABLET ORAL DAILY
Qty: 90 TABLET | Refills: 3 | Status: SHIPPED | OUTPATIENT
Start: 2022-04-06 | End: 2022-04-08 | Stop reason: SDUPTHER

## 2022-04-07 ENCOUNTER — TELEPHONE (OUTPATIENT)
Dept: FAMILY MEDICINE CLINIC | Facility: CLINIC | Age: 47
End: 2022-04-07

## 2022-04-07 NOTE — TELEPHONE ENCOUNTER
PATIENT CALLED AND STATES THE PHARMACY TOLD HER DR. DAVIDSON NEEDS TO APPROVE THE MEDICATION REFILL OF   levothyroxine (SYNTHROID, LEVOTHROID) 100 MCG tablet    SHE ISOUT OF THE MEDICATION       [id number] - [place] [place] - [address] AT [facility] OF  &  - [phone] PH - [phone] FX  [phone]    CALL BACK NUMBER [phone]

## 2022-04-08 RX ORDER — LEVOTHYROXINE SODIUM 0.1 MG/1
100 TABLET ORAL DAILY
Qty: 90 TABLET | Refills: 3 | Status: SHIPPED | OUTPATIENT
Start: 2022-04-08 | End: 2022-09-20

## 2022-08-17 ENCOUNTER — TRANSCRIBE ORDERS (OUTPATIENT)
Dept: ADMINISTRATIVE | Facility: HOSPITAL | Age: 47
End: 2022-08-17

## 2022-08-17 DIAGNOSIS — Z87.42 HISTORY OF OVARIAN CYST: Primary | ICD-10-CM

## 2022-08-17 DIAGNOSIS — Z12.31 SCREENING MAMMOGRAM FOR BREAST CANCER: Primary | ICD-10-CM

## 2022-08-17 DIAGNOSIS — R10.2 PELVIC PAIN: ICD-10-CM

## 2022-09-06 ENCOUNTER — HOSPITAL ENCOUNTER (OUTPATIENT)
Dept: MAMMOGRAPHY | Facility: HOSPITAL | Age: 47
Discharge: HOME | End: 2022-09-06

## 2022-09-06 ENCOUNTER — HOSPITAL ENCOUNTER (OUTPATIENT)
Dept: ULTRASOUND IMAGING | Facility: HOSPITAL | Age: 47
Discharge: HOME | End: 2022-09-06

## 2022-09-06 DIAGNOSIS — R10.2 PELVIC PAIN: ICD-10-CM

## 2022-09-06 DIAGNOSIS — Z87.42 HISTORY OF OVARIAN CYST: ICD-10-CM

## 2022-09-06 DIAGNOSIS — Z12.31 SCREENING MAMMOGRAM FOR BREAST CANCER: ICD-10-CM

## 2022-09-06 PROCEDURE — 93976 VASCULAR STUDY: CPT

## 2022-09-06 PROCEDURE — 76830 TRANSVAGINAL US NON-OB: CPT

## 2022-09-06 PROCEDURE — 76856 US EXAM PELVIC COMPLETE: CPT

## 2022-09-20 ENCOUNTER — OFFICE VISIT (OUTPATIENT)
Dept: FAMILY MEDICINE CLINIC | Facility: CLINIC | Age: 47
End: 2022-09-20

## 2022-09-20 VITALS
WEIGHT: 253.6 LBS | HEART RATE: 57 BPM | BODY MASS INDEX: 43.29 KG/M2 | TEMPERATURE: 98.2 F | RESPIRATION RATE: 16 BRPM | DIASTOLIC BLOOD PRESSURE: 62 MMHG | SYSTOLIC BLOOD PRESSURE: 112 MMHG | OXYGEN SATURATION: 98 % | HEIGHT: 64 IN

## 2022-09-20 DIAGNOSIS — N88.8 NABOTHIAN CYST: ICD-10-CM

## 2022-09-20 DIAGNOSIS — M79.671 FOOT PAIN, RIGHT: ICD-10-CM

## 2022-09-20 DIAGNOSIS — R10.2 PELVIC PAIN: Primary | ICD-10-CM

## 2022-09-20 DIAGNOSIS — E03.9 HYPOTHYROIDISM, UNSPECIFIED TYPE: ICD-10-CM

## 2022-09-20 DIAGNOSIS — M53.3 TAIL BONE PAIN: ICD-10-CM

## 2022-09-20 PROCEDURE — 99214 OFFICE O/P EST MOD 30 MIN: CPT | Performed by: FAMILY MEDICINE

## 2022-09-20 RX ORDER — LEVOTHYROXINE SODIUM 0.12 MG/1
125 TABLET ORAL DAILY
Start: 2022-09-20 | End: 2022-09-20 | Stop reason: SDUPTHER

## 2022-09-20 RX ORDER — MELOXICAM 15 MG/1
15 TABLET ORAL DAILY
Qty: 30 TABLET | Refills: 1 | Status: SHIPPED | OUTPATIENT
Start: 2022-09-20 | End: 2023-01-10

## 2022-09-20 RX ORDER — MELOXICAM 15 MG/1
15 TABLET ORAL DAILY
Qty: 30 TABLET | Refills: 1 | Status: SHIPPED | OUTPATIENT
Start: 2022-09-20 | End: 2022-09-20 | Stop reason: SDUPTHER

## 2022-09-20 RX ORDER — METHYLPREDNISOLONE 4 MG/1
TABLET ORAL
Qty: 21 TABLET | Refills: 0 | Status: SHIPPED | OUTPATIENT
Start: 2022-09-20 | End: 2023-01-10

## 2022-09-20 RX ORDER — LEVOTHYROXINE SODIUM 0.12 MG/1
125 TABLET ORAL DAILY
Qty: 90 TABLET | Refills: 2 | Status: SHIPPED | OUTPATIENT
Start: 2022-09-20 | End: 2023-02-27 | Stop reason: SDUPTHER

## 2022-09-20 NOTE — PROGRESS NOTES
Subjective   Catherine Nicholas is a 47 y.o. female.     History of Present Illness  47-year-old female patient present for follow-up and discuss her recent visit at [facility].  Patient  states she was seen recently at the [facility] due to pelvic pain and tailbone pain.  She also had  Pap smear done which came back positive for HPV.  She also had pelvic ultrasound which showed multiple nabothian cysts in the cervix.      Patient is also complaining of right foot/ankle pain and tailbone pain. The quality of the pain is described as aching and shooting. The pain is at a severity of 5/10. The pain is moderate.  Pertinent negatives include no loss of sensation or muscle weakness. The symptoms are aggravated by movement.     The patient also  presents with 6 months f/u on  hypothyroidism.  She complains of fatigue but denies chest pain, palpitations, shortness of breath, trouble swallowing, anxiety, nervousness, dry skin and hair loss.  Since the last visit, the patient states her levothyroxine dose was increased from 100 to 125 mcg.         The following portions of the patient's history were reviewed and updated as appropriate: past medical history, past social history, past surgical history and problem list.    Review of Systems   Constitutional: Positive for fatigue. Negative for activity change, appetite change and fever.   HENT: Negative for sore throat and trouble swallowing.    Respiratory: Negative for shortness of breath and wheezing.    Cardiovascular: Negative for chest pain and palpitations.   Gastrointestinal: Negative for abdominal pain, nausea, vomiting and indigestion.   Endocrine: Negative for cold intolerance, heat intolerance and polyphagia.   Genitourinary: Positive for pelvic pain. Negative for dysuria, flank pain, frequency, hematuria, vaginal bleeding and vaginal discharge.   Musculoskeletal: Positive for back pain.        Tailbone pain, right foot pain   Neurological: Negative for  headache.   Psychiatric/Behavioral: Negative for sleep disturbance. The patient is not nervous/anxious.        Objective   Physical Exam  Vitals reviewed.   Constitutional:       General: She is not in acute distress.     Appearance: She is well-developed.   Neck:      Thyroid: No thyromegaly.   Cardiovascular:      Heart sounds: Normal heart sounds.   Pulmonary:      Effort: Pulmonary effort is normal.      Breath sounds: Normal breath sounds. No wheezing.   Abdominal:      General: Bowel sounds are normal.      Palpations: Abdomen is soft.      Tenderness: There is no abdominal tenderness.   Musculoskeletal:      Cervical back: Normal range of motion and neck supple. No tenderness.      Lumbar back: Tenderness present. Decreased range of motion.      Right foot: Normal range of motion. No swelling or tenderness.      Left foot: No swelling, deformity or tenderness.   Neurological:      Mental Status: She is alert and oriented to person, place, and time.   Psychiatric:         Mood and Affect: Mood normal.       Vitals:    09/20/22 1311   BP: 112/62   Pulse: 57   Resp: 16   Temp: 98.2 °F (36.8 °C)   SpO2: 98%     Body mass index is 43.53 kg/m².  Class 3 Severe Obesity (BMI >=40). Obesity-related health conditions include the following: hypothyuroidism and lower back pain. BMI is is above average; BMI management plan is completed. We discussed portion control and increasing exercise.    No current outpatient medications on file prior to visit.     No current facility-administered medications on file prior to visit.     .      Assessment & Plan   Problems Addressed this Visit        Endocrine and Metabolic    Hypothyroidism     Patient has recent blood work done at [facility] record requested-refill levothyroxine.         Relevant Medications    levothyroxine (SYNTHROID, LEVOTHROID) 125 MCG tablet    methylPREDNISolone (MEDROL) 4 MG dose pack       Gastrointestinal Abdominal     Pelvic pain - Primary      Awaiting records from recent pap smear and US at [facility].  Refer to OB/GYN for further management.         Relevant Orders    Ambulatory Referral to Obstetrics / Gynecology       Genitourinary and Reproductive     Nabothian cyst    Relevant Orders    Ambulatory Referral to Obstetrics / Gynecology       Musculoskeletal and Injuries    Foot pain, right       Neuro    Tail bone pain     Discussed conservative management, NSAIDs/Tylenol Rx Medrol Dosepak as directed.  Refer for physical therapy         Relevant Orders    Ambulatory Referral to Physical Therapy Evaluate and treat      Diagnoses       Codes Comments    Pelvic pain    -  Primary ICD-10-CM: R10.2  ICD-9-CM: [id number]     Nabothian cyst     ICD-10-CM: N88.8  ICD-9-CM: 616.0     Tail bone pain     ICD-10-CM: M53.3  ICD-9-CM: 724.79     Foot pain, right     ICD-10-CM: M79.671  ICD-9-CM: 729.5     Hypothyroidism, unspecified type     ICD-10-CM: E03.9  ICD-9-CM: 244.9

## 2022-09-27 NOTE — ASSESSMENT & PLAN NOTE
Discussed conservative management, NSAIDs/Tylenol Rx Medrol Dosepak as directed.  Refer for physical therapy

## 2022-09-27 NOTE — ASSESSMENT & PLAN NOTE
Awaiting records from recent pap smear and US at [facility].  Refer to OB/GYN for further management.

## 2022-09-27 NOTE — ASSESSMENT & PLAN NOTE
Patient has recent blood work done at [facility] record requested-refill levothyroxine.

## 2023-01-10 ENCOUNTER — LAB (OUTPATIENT)
Dept: FAMILY MEDICINE CLINIC | Facility: CLINIC | Age: 48
End: 2023-01-10
Payer: MEDICAID

## 2023-01-10 ENCOUNTER — OFFICE VISIT (OUTPATIENT)
Dept: FAMILY MEDICINE CLINIC | Facility: CLINIC | Age: 48
End: 2023-01-10
Payer: MEDICAID

## 2023-01-10 VITALS
HEIGHT: 64 IN | RESPIRATION RATE: 16 BRPM | SYSTOLIC BLOOD PRESSURE: 122 MMHG | HEART RATE: 52 BPM | TEMPERATURE: 97.9 F | DIASTOLIC BLOOD PRESSURE: 70 MMHG | OXYGEN SATURATION: 97 % | BODY MASS INDEX: 39.88 KG/M2 | WEIGHT: 233.6 LBS

## 2023-01-10 DIAGNOSIS — R10.30 LOWER ABDOMINAL PAIN: ICD-10-CM

## 2023-01-10 DIAGNOSIS — Z12.31 SCREENING MAMMOGRAM FOR BREAST CANCER: ICD-10-CM

## 2023-01-10 DIAGNOSIS — E03.9 HYPOTHYROIDISM, UNSPECIFIED TYPE: ICD-10-CM

## 2023-01-10 DIAGNOSIS — Z01.419 ENCOUNTER FOR GYNECOLOGICAL EXAMINATION (GENERAL) (ROUTINE) WITHOUT ABNORMAL FINDINGS: Primary | ICD-10-CM

## 2023-01-10 LAB
ALBUMIN SERPL-MCNC: 4.2 G/DL (ref 3.5–5.2)
ALBUMIN/GLOB SERPL: 1.2 G/DL
ALP SERPL-CCNC: 129 U/L (ref 39–117)
ALT SERPL W P-5'-P-CCNC: 6 U/L (ref 1–33)
ANION GAP SERPL CALCULATED.3IONS-SCNC: 11 MMOL/L (ref 5–15)
AST SERPL-CCNC: 9 U/L (ref 1–32)
BASOPHILS # BLD AUTO: 0.03 10*3/MM3 (ref 0–0.2)
BASOPHILS NFR BLD AUTO: 0.4 % (ref 0–1.5)
BILIRUB SERPL-MCNC: 0.3 MG/DL (ref 0–1.2)
BUN SERPL-MCNC: 16 MG/DL (ref 6–20)
BUN/CREAT SERPL: 20.5 (ref 7–25)
CALCIUM SPEC-SCNC: 9.5 MG/DL (ref 8.6–10.5)
CHLORIDE SERPL-SCNC: 102 MMOL/L (ref 98–107)
CHOLEST SERPL-MCNC: 149 MG/DL (ref 0–200)
CO2 SERPL-SCNC: 26 MMOL/L (ref 22–29)
CREAT SERPL-MCNC: 0.78 MG/DL (ref 0.57–1)
DEPRECATED RDW RBC AUTO: 42.1 FL (ref 37–54)
EGFRCR SERPLBLD CKD-EPI 2021: 93.8 ML/MIN/1.73
EOSINOPHIL # BLD AUTO: 0.21 10*3/MM3 (ref 0–0.4)
EOSINOPHIL NFR BLD AUTO: 2.7 % (ref 0.3–6.2)
ERYTHROCYTE [DISTWIDTH] IN BLOOD BY AUTOMATED COUNT: 13.5 % (ref 12.3–15.4)
GLOBULIN UR ELPH-MCNC: 3.6 GM/DL
GLUCOSE SERPL-MCNC: 87 MG/DL (ref 65–99)
HCT VFR BLD AUTO: 40.6 % (ref 34–46.6)
HDLC SERPL-MCNC: 51 MG/DL (ref 40–60)
HGB BLD-MCNC: 13 G/DL (ref 12–15.9)
IMM GRANULOCYTES # BLD AUTO: 0.02 10*3/MM3 (ref 0–0.05)
IMM GRANULOCYTES NFR BLD AUTO: 0.3 % (ref 0–0.5)
LDLC SERPL CALC-MCNC: 83 MG/DL (ref 0–100)
LDLC/HDLC SERPL: 1.63 {RATIO}
LYMPHOCYTES # BLD AUTO: 2.41 10*3/MM3 (ref 0.7–3.1)
LYMPHOCYTES NFR BLD AUTO: 30.5 % (ref 19.6–45.3)
MCH RBC QN AUTO: 27.4 PG (ref 26.6–33)
MCHC RBC AUTO-ENTMCNC: 32 G/DL (ref 31.5–35.7)
MCV RBC AUTO: 85.5 FL (ref 79–97)
MONOCYTES # BLD AUTO: 0.45 10*3/MM3 (ref 0.1–0.9)
MONOCYTES NFR BLD AUTO: 5.7 % (ref 5–12)
NEUTROPHILS NFR BLD AUTO: 4.78 10*3/MM3 (ref 1.7–7)
NEUTROPHILS NFR BLD AUTO: 60.4 % (ref 42.7–76)
NRBC BLD AUTO-RTO: 0 /100 WBC (ref 0–0.2)
PLATELET # BLD AUTO: 343 10*3/MM3 (ref 140–450)
PMV BLD AUTO: 9.5 FL (ref 6–12)
POTASSIUM SERPL-SCNC: 4.2 MMOL/L (ref 3.5–5.2)
PROT SERPL-MCNC: 7.8 G/DL (ref 6–8.5)
RBC # BLD AUTO: 4.75 10*6/MM3 (ref 3.77–5.28)
SODIUM SERPL-SCNC: 139 MMOL/L (ref 136–145)
TRIGL SERPL-MCNC: 75 MG/DL (ref 0–150)
TSH SERPL DL<=0.05 MIU/L-ACNC: 0.6 UIU/ML (ref 0.27–4.2)
VLDLC SERPL-MCNC: 15 MG/DL (ref 5–40)
WBC NRBC COR # BLD: 7.9 10*3/MM3 (ref 3.4–10.8)

## 2023-01-10 PROCEDURE — 99213 OFFICE O/P EST LOW 20 MIN: CPT | Performed by: FAMILY MEDICINE

## 2023-01-10 PROCEDURE — 36415 COLL VENOUS BLD VENIPUNCTURE: CPT | Performed by: FAMILY MEDICINE

## 2023-01-10 PROCEDURE — 80061 LIPID PANEL: CPT | Performed by: FAMILY MEDICINE

## 2023-01-10 PROCEDURE — 80050 GENERAL HEALTH PANEL: CPT | Performed by: FAMILY MEDICINE

## 2023-01-10 PROCEDURE — 99396 PREV VISIT EST AGE 40-64: CPT | Performed by: FAMILY MEDICINE

## 2023-01-10 NOTE — PROGRESS NOTES
Subjective   Catherine Nicholas is a 47 y.o. female.     History of Present Illness     The patient  presents for annual GYN examination. The patient is C/O lower abdominal pain more than 3-months which is intermittent, mild and get worse with walking or movement. She denies abnormal pap smears, abnormal periods, pelvic pain, abnormal vaginal discharge, breast mass or lumps, urinary symptoms and nausea vomiting.  The patient notes that she performs self breast exams frequently and has no breast concerns.         The following portions of the patient's history were reviewed and updated as appropriate: past medical history, past social history, past surgical history and problem list.    Review of Systems   Constitutional: Negative for appetite change, fatigue and fever.   HENT: Negative for trouble swallowing.    Cardiovascular: Negative for chest pain and palpitations.   Gastrointestinal: Positive for abdominal pain. Negative for constipation, diarrhea, nausea, vomiting and indigestion.   Endocrine: Negative for cold intolerance and polyphagia.   Genitourinary: Negative for breast discharge, breast lump, breast pain, dysuria, flank pain, frequency and pelvic pain.   Neurological: Negative for headache.   Psychiatric/Behavioral: The patient is not nervous/anxious.        Objective   Physical Exam  Vitals reviewed.   Constitutional:       General: She is not in acute distress.     Appearance: She is well-developed.   Neck:      Thyroid: No thyromegaly.   Cardiovascular:      Heart sounds: Normal heart sounds.   Pulmonary:      Effort: Pulmonary effort is normal.      Breath sounds: Normal breath sounds.   Chest:   Breasts:     Breasts are symmetrical.      Right: No inverted nipple, mass, nipple discharge, skin change or tenderness.      Left: No inverted nipple, mass, nipple discharge, skin change or tenderness.   Abdominal:      General: Bowel sounds are normal.      Palpations: Abdomen is soft.      Tenderness: There is  no right CVA tenderness, left CVA tenderness, guarding or rebound.      Comments: Positive left lower quadrant tenderness   Genitourinary:     Vagina: No vaginal discharge or erythema.      Cervix: Normal.      Uterus: Normal.       Adnexa: Right adnexa normal and left adnexa normal.   Musculoskeletal:         General: Normal range of motion.      Cervical back: Normal range of motion and neck supple. No tenderness.   Neurological:      Mental Status: She is alert and oriented to person, place, and time.   Psychiatric:         Mood and Affect: Mood normal.       Vitals:    01/10/23 1054   BP: 122/70   Pulse: 52   Resp: 16   Temp: 97.9 °F (36.6 °C)   SpO2: 97%     Current Outpatient Medications on File Prior to Visit   Medication Sig Dispense Refill   • levothyroxine (SYNTHROID, LEVOTHROID) 125 MCG tablet Take 1 tablet by mouth Daily. 90 tablet 2     No current facility-administered medications on file prior to visit.           Assessment & Plan   Problems Addressed this Visit        Endocrine and Metabolic    Hypothyroidism     We will check TSH level continue current dose of levothyroxine         Relevant Orders    Lipid panel (Completed)    TSH (Completed)       Gastrointestinal Abdominal     Lower abdominal pain     Discussed symptom management we will check CBC and CMP.  Refer to GI for further management.         Relevant Orders    Comprehensive metabolic panel (Completed)    CBC w AUTO Differential (Completed)    Ambulatory Referral to Gastroenterology       Genitourinary and Reproductive     Encounter for gynecological examination (general) (routine) without abnormal findings - Primary     Normal breast and pelvic exam. Pap done. Due for screening mammogram order placed patient will schedule at her convenience.  Discussed healthy diet and  importance of regular exerciseStressed importance of moderation in sodium/caffeine intake, saturated fat and cholesterol, caloric balance, sufficient intake of fresh  fruits, vegetables, fiber, calcium and iron.           Relevant Orders    IGP,Aptima HPV,Age Gdln    Comprehensive metabolic panel (Completed)    Lipid panel (Completed)    TSH (Completed)    CBC w AUTO Differential (Completed)       Other    Screening mammogram for breast cancer    Relevant Orders    Mammo Screening Digital Tomosynthesis Bilateral With CAD   Diagnoses       Codes Comments    Encounter for gynecological examination (general) (routine) without abnormal findings    -  Primary ICD-10-CM: Z01.419  ICD-9-CM: V72.31     Screening mammogram for breast cancer     ICD-10-CM: Z12.31  ICD-9-CM: V76.12     Hypothyroidism, unspecified type     ICD-10-CM: E03.9  ICD-9-CM: 244.9     Lower abdominal pain     ICD-10-CM: R10.30  ICD-9-CM: 789.09         Problems Addressed this Visit        Endocrine and Metabolic    Hypothyroidism     We will check TSH level continue current dose of levothyroxine         Relevant Orders    Lipid panel (Completed)    TSH (Completed)       Gastrointestinal Abdominal     Lower abdominal pain     Discussed symptom management we will check CBC and CMP.  Refer to GI for further management.         Relevant Orders    Comprehensive metabolic panel (Completed)    CBC w AUTO Differential (Completed)    Ambulatory Referral to Gastroenterology       Genitourinary and Reproductive     Encounter for gynecological examination (general) (routine) without abnormal findings - Primary     Normal breast and pelvic exam. Pap done. Due for screening mammogram order placed patient will schedule at her convenience.  Discussed healthy diet and  importance of regular exerciseStressed importance of moderation in sodium/caffeine intake, saturated fat and cholesterol, caloric balance, sufficient intake of fresh fruits, vegetables, fiber, calcium and iron.           Relevant Orders    IGP,Aptima HPV,Age Gdln    Comprehensive metabolic panel (Completed)    Lipid panel (Completed)    TSH (Completed)    CBC w AUTO  Differential (Completed)       Other    Screening mammogram for breast cancer    Relevant Orders    Mammo Screening Digital Tomosynthesis Bilateral With CAD   Diagnoses       Codes Comments    Encounter for gynecological examination (general) (routine) without abnormal findings    -  Primary ICD-10-CM: Z01.419  ICD-9-CM: V72.31     Screening mammogram for breast cancer     ICD-10-CM: Z12.31  ICD-9-CM: V76.12     Hypothyroidism, unspecified type     ICD-10-CM: E03.9  ICD-9-CM: 244.9     Lower abdominal pain     ICD-10-CM: R10.30  ICD-9-CM: 789.09

## 2023-01-12 PROBLEM — Z12.31 SCREENING MAMMOGRAM FOR BREAST CANCER: Status: ACTIVE | Noted: 2023-01-12

## 2023-01-12 PROBLEM — R10.30 LOWER ABDOMINAL PAIN: Status: ACTIVE | Noted: 2023-01-12

## 2023-01-12 LAB
AGE GDLN ACOG TESTING: NORMAL
CYTOLOGIST CVX/VAG CYTO: NORMAL
CYTOLOGY CVX/VAG DOC CYTO: NORMAL
CYTOLOGY CVX/VAG DOC THIN PREP: NORMAL
DX ICD CODE: NORMAL
HIV 1 & 2 AB SER-IMP: NORMAL
HPV GENOTYPE REFLEX: NORMAL
HPV I/H RISK 4 DNA CVX QL PROBE+SIG AMP: NEGATIVE
OTHER STN SPEC: NORMAL
STAT OF ADQ CVX/VAG CYTO-IMP: NORMAL

## 2023-01-12 NOTE — ASSESSMENT & PLAN NOTE
Normal breast and pelvic exam. Pap done. Due for screening mammogram order placed patient will schedule at her convenience.  Discussed healthy diet and  importance of regular exerciseStressed importance of moderation in sodium/caffeine intake, saturated fat and cholesterol, caloric balance, sufficient intake of fresh fruits, vegetables, fiber, calcium and iron.

## 2023-01-19 ENCOUNTER — HOSPITAL ENCOUNTER (OUTPATIENT)
Dept: MAMMOGRAPHY | Facility: HOSPITAL | Age: 48
Discharge: HOME | End: 2023-01-19
Admitting: FAMILY MEDICINE
Payer: MEDICAID

## 2023-01-19 PROCEDURE — 77063 BREAST TOMOSYNTHESIS BI: CPT

## 2023-01-19 PROCEDURE — 77067 SCR MAMMO BI INCL CAD: CPT

## 2023-02-27 RX ORDER — LEVOTHYROXINE SODIUM 0.12 MG/1
125 TABLET ORAL DAILY
Qty: 90 TABLET | Refills: 2 | Status: SHIPPED | OUTPATIENT
Start: 2023-02-27

## 2023-02-27 NOTE — TELEPHONE ENCOUNTER
Caller: CristopherOmia    Relationship: Self    Best call back number:[phone]    Requested Prescriptions:   Requested Prescriptions     Pending Prescriptions Disp Refills   • levothyroxine (SYNTHROID, LEVOTHROID) 125 MCG tablet 90 tablet 2     Sig: Take 1 tablet by mouth Daily.        Pharmacy where request should be sent: [facility] [address] [place] [address] [id number]   - [phone]  - [phone] FX     Additional details provided by patient: 2 DAYS LEFT/NEW PHARMACY  Does the patient have less than a 3 day supply:  [x] Yes  [] No    Would you like a call back once the refill request has been completed: [] Yes [x] No    If the office needs to give you a call back, can they leave a voicemail: [] Yes [x] No    Cornelia Barnett Rep   02/27/23 11:19 EST

## 2023-03-15 ENCOUNTER — TRANSCRIBE ORDERS (OUTPATIENT)
Dept: ADMINISTRATIVE | Facility: HOSPITAL | Age: 48
End: 2023-03-15
Payer: MEDICAID

## 2023-03-15 DIAGNOSIS — R10.9 ABDOMINAL PAIN, UNSPECIFIED ABDOMINAL LOCATION: Primary | ICD-10-CM

## 2023-05-12 ENCOUNTER — OFFICE VISIT (OUTPATIENT)
Dept: FAMILY MEDICINE CLINIC | Facility: CLINIC | Age: 48
End: 2023-05-12
Payer: MEDICAID

## 2023-05-12 VITALS
SYSTOLIC BLOOD PRESSURE: 101 MMHG | BODY MASS INDEX: 42.34 KG/M2 | WEIGHT: 248 LBS | RESPIRATION RATE: 16 BRPM | OXYGEN SATURATION: 97 % | HEIGHT: 64 IN | HEART RATE: 53 BPM | TEMPERATURE: 97.5 F | DIASTOLIC BLOOD PRESSURE: 68 MMHG

## 2023-05-12 DIAGNOSIS — H60.502 ACUTE OTITIS EXTERNA OF LEFT EAR, UNSPECIFIED TYPE: Primary | ICD-10-CM

## 2023-05-12 PROCEDURE — 99213 OFFICE O/P EST LOW 20 MIN: CPT | Performed by: FAMILY MEDICINE

## 2023-05-12 RX ORDER — FUROSEMIDE 20 MG/1
TABLET ORAL EVERY 24 HOURS
COMMUNITY
Start: 2022-08-01

## 2023-05-12 RX ORDER — CIPROFLOXACIN AND DEXAMETHASONE 3; 1 MG/ML; MG/ML
4 SUSPENSION/ DROPS AURICULAR (OTIC) 2 TIMES DAILY
Qty: 7.5 ML | Refills: 0 | Status: SHIPPED | OUTPATIENT
Start: 2023-05-12 | End: 2023-05-19

## 2023-05-12 NOTE — PROGRESS NOTES
Subjective   Catherine Nicholas is a 47 y.o. female.     Earache   There is pain in both ears. This is a new problem. The current episode started in the past 7 days. The problem has been gradually worsening. There has been no fever. The pain is at a severity of 4/10. Associated symptoms include coughing and rhinorrhea. Pertinent negatives include no headaches, hearing loss, neck pain or sore throat. Associated symptoms comments: congestion. She has tried acetaminophen for the symptoms.        The following portions of the patient's history were reviewed and updated as appropriate: past medical history, past social history, past surgical history and problem list.    Review of Systems   Constitutional: Negative for fever.   HENT: Positive for congestion, ear pain, postnasal drip and rhinorrhea. Negative for hearing loss, sinus pressure and sore throat.    Respiratory: Positive for cough. Negative for shortness of breath.    Musculoskeletal: Negative for neck pain.   Neurological: Negative for dizziness and headache.       Objective   Physical Exam  Vitals reviewed.   HENT:      Right Ear: Tympanic membrane, ear canal and external ear normal.      Left Ear: Hearing and tympanic membrane normal. Tenderness present. No drainage.      Ears:      Comments: External canal red and tender.  Neurological:      Mental Status: She is alert and oriented to person, place, and time.       Vitals:    05/12/23 1146   BP: 101/68   Pulse: 53   Resp: 16   Temp: 97.5 °F (36.4 °C)   SpO2: 97%     Current Outpatient Medications on File Prior to Visit   Medication Sig Dispense Refill   • furosemide (LASIX) 20 MG tablet Daily.     • levothyroxine (SYNTHROID, LEVOTHROID) 125 MCG tablet Take 1 tablet by mouth Daily. 90 tablet 2     No current facility-administered medications on file prior to visit.           Assessment & Plan   Problems Addressed this Visit        ENT    Acute otitis externa of left ear - Primary   Diagnoses       Codes Comments     Acute otitis externa of left ear, unspecified type    -  Primary ICD-10-CM: H60.502  ICD-9-CM: 380.10

## 2023-05-13 ENCOUNTER — HOSPITAL ENCOUNTER (OUTPATIENT)
Dept: CT IMAGING | Facility: HOSPITAL | Age: 48
Discharge: HOME | End: 2023-05-13
Payer: MEDICAID

## 2023-05-13 DIAGNOSIS — R10.9 ABDOMINAL PAIN, UNSPECIFIED ABDOMINAL LOCATION: ICD-10-CM

## 2023-05-13 PROCEDURE — 25510000001 IOPAMIDOL PER 1 ML: Performed by: INTERNAL MEDICINE

## 2023-05-13 PROCEDURE — 74177 CT ABD & PELVIS W/CONTRAST: CPT

## 2023-05-13 RX ADMIN — IOPAMIDOL 100 ML: 755 INJECTION, SOLUTION INTRAVENOUS at 13:18

## 2023-07-26 ENCOUNTER — TELEPHONE (OUTPATIENT)
Dept: FAMILY MEDICINE CLINIC | Facility: CLINIC | Age: 48
End: 2023-07-26

## 2023-07-26 NOTE — TELEPHONE ENCOUNTER
Caller: Catherine Nicholas    Relationship to patient: Self    Best call back number: [phone]     Date of positive COVID19 test: 7/23/2023    COVID19 symptoms: CHILLS, FEVER, SORE THROAT, COUGH 3 DAYS    Additional information or concerns: PATIENT WOULD LIKE A CALL TO DISCUSS WHAT SHE CAN DO TO LESSEN SYMPTOMS    What is the patients preferred pharmacy:   [address] [place] [address] [facility]  - [phone] [facility] [phone]  [phone]

## 2023-11-09 ENCOUNTER — OFFICE VISIT (OUTPATIENT)
Dept: FAMILY MEDICINE CLINIC | Facility: CLINIC | Age: 48
End: 2023-11-09
Payer: MEDICAID

## 2023-11-09 ENCOUNTER — LAB (OUTPATIENT)
Dept: FAMILY MEDICINE CLINIC | Facility: CLINIC | Age: 48
End: 2023-11-09
Payer: MEDICAID

## 2023-11-09 VITALS
TEMPERATURE: 97.2 F | HEIGHT: 64 IN | WEIGHT: 251 LBS | DIASTOLIC BLOOD PRESSURE: 76 MMHG | OXYGEN SATURATION: 97 % | SYSTOLIC BLOOD PRESSURE: 122 MMHG | RESPIRATION RATE: 16 BRPM | BODY MASS INDEX: 42.85 KG/M2 | HEART RATE: 56 BPM

## 2023-11-09 DIAGNOSIS — M54.2 NECK PAIN: ICD-10-CM

## 2023-11-09 DIAGNOSIS — E78.2 MIXED HYPERLIPIDEMIA: ICD-10-CM

## 2023-11-09 DIAGNOSIS — E03.9 HYPOTHYROIDISM, UNSPECIFIED TYPE: Primary | ICD-10-CM

## 2023-11-09 DIAGNOSIS — R53.83 FATIGUE, UNSPECIFIED TYPE: ICD-10-CM

## 2023-11-09 LAB
25(OH)D3 SERPL-MCNC: 23.3 NG/ML (ref 30–100)
ALBUMIN SERPL-MCNC: 4.5 G/DL (ref 3.5–5.2)
ALBUMIN/GLOB SERPL: 1.6 G/DL
ALP SERPL-CCNC: 128 U/L (ref 39–117)
ALT SERPL W P-5'-P-CCNC: 12 U/L (ref 1–33)
ANION GAP SERPL CALCULATED.3IONS-SCNC: 7 MMOL/L (ref 5–15)
AST SERPL-CCNC: 13 U/L (ref 1–32)
BASOPHILS # BLD AUTO: 0.04 10*3/MM3 (ref 0–0.2)
BASOPHILS NFR BLD AUTO: 0.6 % (ref 0–1.5)
BILIRUB SERPL-MCNC: 0.3 MG/DL (ref 0–1.2)
BUN SERPL-MCNC: 16 MG/DL (ref 6–20)
BUN/CREAT SERPL: 21.6 (ref 7–25)
CALCIUM SPEC-SCNC: 9.6 MG/DL (ref 8.6–10.5)
CHLORIDE SERPL-SCNC: 104 MMOL/L (ref 98–107)
CHOLEST SERPL-MCNC: 173 MG/DL (ref 0–200)
CO2 SERPL-SCNC: 27 MMOL/L (ref 22–29)
CREAT SERPL-MCNC: 0.74 MG/DL (ref 0.57–1)
DEPRECATED RDW RBC AUTO: 41.9 FL (ref 37–54)
EGFRCR SERPLBLD CKD-EPI 2021: 99.3 ML/MIN/1.73
EOSINOPHIL # BLD AUTO: 0.17 10*3/MM3 (ref 0–0.4)
EOSINOPHIL NFR BLD AUTO: 2.5 % (ref 0.3–6.2)
ERYTHROCYTE [DISTWIDTH] IN BLOOD BY AUTOMATED COUNT: 13.9 % (ref 12.3–15.4)
GLOBULIN UR ELPH-MCNC: 2.8 GM/DL
GLUCOSE SERPL-MCNC: 90 MG/DL (ref 65–99)
HCT VFR BLD AUTO: 37.5 % (ref 34–46.6)
HDLC SERPL-MCNC: 52 MG/DL (ref 40–60)
HGB BLD-MCNC: 12.5 G/DL (ref 12–15.9)
IMM GRANULOCYTES # BLD AUTO: 0.01 10*3/MM3 (ref 0–0.05)
IMM GRANULOCYTES NFR BLD AUTO: 0.1 % (ref 0–0.5)
LDLC SERPL CALC-MCNC: 101 MG/DL (ref 0–100)
LDLC/HDLC SERPL: 1.89 {RATIO}
LYMPHOCYTES # BLD AUTO: 2.27 10*3/MM3 (ref 0.7–3.1)
LYMPHOCYTES NFR BLD AUTO: 33.2 % (ref 19.6–45.3)
MCH RBC QN AUTO: 28.1 PG (ref 26.6–33)
MCHC RBC AUTO-ENTMCNC: 33.3 G/DL (ref 31.5–35.7)
MCV RBC AUTO: 84.3 FL (ref 79–97)
MONOCYTES # BLD AUTO: 0.35 10*3/MM3 (ref 0.1–0.9)
MONOCYTES NFR BLD AUTO: 5.1 % (ref 5–12)
NEUTROPHILS NFR BLD AUTO: 3.99 10*3/MM3 (ref 1.7–7)
NEUTROPHILS NFR BLD AUTO: 58.5 % (ref 42.7–76)
NRBC BLD AUTO-RTO: 0.1 /100 WBC (ref 0–0.2)
PLATELET # BLD AUTO: 310 10*3/MM3 (ref 140–450)
PMV BLD AUTO: 9.3 FL (ref 6–12)
POTASSIUM SERPL-SCNC: 4.5 MMOL/L (ref 3.5–5.2)
PROT SERPL-MCNC: 7.3 G/DL (ref 6–8.5)
RBC # BLD AUTO: 4.45 10*6/MM3 (ref 3.77–5.28)
SODIUM SERPL-SCNC: 138 MMOL/L (ref 136–145)
T3FREE SERPL-MCNC: 2.35 PG/ML (ref 2–4.4)
T4 FREE SERPL-MCNC: 1.36 NG/DL (ref 0.93–1.7)
TRIGL SERPL-MCNC: 113 MG/DL (ref 0–150)
TSH SERPL DL<=0.05 MIU/L-ACNC: 2.9 UIU/ML (ref 0.27–4.2)
VIT B12 BLD-MCNC: 262 PG/ML (ref 211–946)
VLDLC SERPL-MCNC: 20 MG/DL (ref 5–40)
WBC NRBC COR # BLD: 6.83 10*3/MM3 (ref 3.4–10.8)

## 2023-11-09 PROCEDURE — 84439 ASSAY OF FREE THYROXINE: CPT | Performed by: FAMILY MEDICINE

## 2023-11-09 PROCEDURE — 1160F RVW MEDS BY RX/DR IN RCRD: CPT | Performed by: FAMILY MEDICINE

## 2023-11-09 PROCEDURE — 1159F MED LIST DOCD IN RCRD: CPT | Performed by: FAMILY MEDICINE

## 2023-11-09 PROCEDURE — 80050 GENERAL HEALTH PANEL: CPT | Performed by: FAMILY MEDICINE

## 2023-11-09 PROCEDURE — 82607 VITAMIN B-12: CPT | Performed by: FAMILY MEDICINE

## 2023-11-09 PROCEDURE — 99214 OFFICE O/P EST MOD 30 MIN: CPT | Performed by: FAMILY MEDICINE

## 2023-11-09 PROCEDURE — 84481 FREE ASSAY (FT-3): CPT | Performed by: FAMILY MEDICINE

## 2023-11-09 PROCEDURE — 82306 VITAMIN D 25 HYDROXY: CPT | Performed by: FAMILY MEDICINE

## 2023-11-09 PROCEDURE — 80061 LIPID PANEL: CPT | Performed by: FAMILY MEDICINE

## 2023-11-09 PROCEDURE — 36415 COLL VENOUS BLD VENIPUNCTURE: CPT | Performed by: FAMILY MEDICINE

## 2023-11-09 NOTE — ASSESSMENT & PLAN NOTE
Discussed intermittent application of heat,  analgesics and muscle relaxants are recommended.  Consider Physical Therapy  if not improving. Call or return to clinic prn if these symptoms worsen or fail to improve as anticipated.  We will check cervical x-ray.

## 2023-11-09 NOTE — ASSESSMENT & PLAN NOTE
Diet controlled discussed dietary changes and lifestyle modifications..  Nutritional counseling was provided.  Lipids will be reassessed in 6 months.

## 2023-11-09 NOTE — PROGRESS NOTES
Subjective   Catherine Nicholas is a 48 y.o. female.     History of Present Illness     The patient  presents with 6 months f/u on  hypothyroidism and hyperlipidemia.  She complains of fatigue, lack of energy but denies chest pain, palpitations, shortness of breath, trouble swallowing, anxiety, nervousness, dry skin and hair loss.  Since the last visit, the patient states she is taking medication as directed.  She is also complaining of neck pain.  She does have a history of chronic neck pain but her symptoms are worsening since last 2 months.  Pain is located in the back of neck pain is intermittent, dull ache and  severity is 3 out of 10 on pain scale.  Associated symptoms are nausea  and headache but denies vomiting, vision changes or dizziness.    The following portions of the patient's history were reviewed and updated as appropriate: past medical history, past social history, past surgical history and problem list.    Review of Systems   Constitutional:  Positive for fatigue. Negative for activity change and appetite change.   HENT:  Negative for trouble swallowing.    Eyes:  Negative for visual disturbance.   Cardiovascular:  Negative for chest pain and palpitations.   Gastrointestinal:  Positive for nausea. Negative for abdominal pain and vomiting.   Endocrine: Negative for cold intolerance and heat intolerance.   Musculoskeletal:  Positive for neck pain.   Neurological:  Positive for headache. Negative for dizziness.   Psychiatric/Behavioral:  Negative for sleep disturbance and depressed mood. The patient is not nervous/anxious.        Objective   Physical Exam  Vitals reviewed.   Constitutional:       Appearance: She is well-developed.   Neck:      Thyroid: No thyromegaly.   Cardiovascular:      Heart sounds: Normal heart sounds.   Pulmonary:      Effort: Pulmonary effort is normal.      Breath sounds: Normal breath sounds. No wheezing.   Abdominal:      Tenderness: There is no abdominal tenderness.    Musculoskeletal:      Cervical back: Normal range of motion. Muscular tenderness present.   Neurological:      Mental Status: She is alert and oriented to person, place, and time.   Psychiatric:         Mood and Affect: Mood normal.       Vitals:    11/09/23 0813   BP: 122/76   Pulse: 56   Resp: 16   Temp: 97.2 °F (36.2 °C)   SpO2: 97%     Current Outpatient Medications on File Prior to Visit   Medication Sig Dispense Refill    furosemide (LASIX) 20 MG tablet Daily.      levothyroxine (SYNTHROID, LEVOTHROID) 125 MCG tablet Take 1 tablet by mouth Daily. 90 tablet 2    [DISCONTINUED] neomycin-polymyxin-hydrocortisone (CORTISPORIN) 3.5-76171-6 otic solution Administer 3 drops into ear(s) as directed by provider 3 (Three) Times a Day. (Patient not taking: Reported on 11/9/2023) 10 mL 0     No current facility-administered medications on file prior to visit.           Assessment & Plan   Problems Addressed this Visit       Hypothyroidism - Primary     We will check TSH, free T3 and free T4.  Continue current dose of levothyroxine.         Relevant Orders    Comprehensive metabolic panel    TSH    T4, free    CBC w AUTO Differential    T3, free    Hyperlipidemia     Diet controlled discussed dietary changes and lifestyle modifications..  Nutritional counseling was provided.  Lipids will be reassessed in 6 months.         Relevant Orders    Lipid panel    Comprehensive metabolic panel    Neck pain     Discussed intermittent application of heat,  analgesics and muscle relaxants are recommended.  Consider Physical Therapy  if not improving. Call or return to clinic prn if these symptoms worsen or fail to improve as anticipated.  We will check cervical x-ray.         Relevant Orders    XR Spine Cervical 2 or 3 View    Fatigue    Relevant Orders    Comprehensive metabolic panel    Vitamin B12    TSH    Vitamin D 25 hydroxy     Diagnoses         Codes Comments    Hypothyroidism, unspecified type    -  Primary ICD-10-CM:  E03.9  ICD-9-CM: 244.9     Mixed hyperlipidemia     ICD-10-CM: E78.2  ICD-9-CM: 272.2     Fatigue, unspecified type     ICD-10-CM: R53.83  ICD-9-CM: 780.79     Neck pain     ICD-10-CM: M54.2  ICD-9-CM: 723.1

## 2023-11-09 NOTE — ASSESSMENT & PLAN NOTE
We will check TSH, free T3 and free T4.  Continue current dose of levothyroxine.   Acute on chronic systolic congestive heart failure

## 2023-11-10 ENCOUNTER — TELEPHONE (OUTPATIENT)
Dept: FAMILY MEDICINE CLINIC | Facility: CLINIC | Age: 48
End: 2023-11-10

## 2023-11-10 DIAGNOSIS — M54.2 NECK PAIN: ICD-10-CM

## 2023-11-10 RX ORDER — ERGOCALCIFEROL 1.25 MG/1
50000 CAPSULE ORAL WEEKLY
Qty: 5 CAPSULE | Refills: 4 | Status: SHIPPED | OUTPATIENT
Start: 2023-11-10

## 2023-11-10 NOTE — TELEPHONE ENCOUNTER
Caller: Catherine Nicholas    Relationship: Self    Best call back number: [phone]    Caller requesting test results: PATIENT     What test was performed: BLOOD WORK    When was the test performed: 11/09/23    Where was the test performed: OFFICE     Additional notes: REQUESTED CALLBACK

## 2023-12-04 ENCOUNTER — OFFICE VISIT (OUTPATIENT)
Dept: FAMILY MEDICINE CLINIC | Facility: CLINIC | Age: 48
End: 2023-12-04
Payer: MEDICAID

## 2023-12-04 VITALS
DIASTOLIC BLOOD PRESSURE: 77 MMHG | BODY MASS INDEX: 42.85 KG/M2 | WEIGHT: 251 LBS | HEART RATE: 58 BPM | SYSTOLIC BLOOD PRESSURE: 115 MMHG | OXYGEN SATURATION: 98 % | TEMPERATURE: 97.3 F | HEIGHT: 64 IN | RESPIRATION RATE: 16 BRPM

## 2023-12-04 DIAGNOSIS — H60.502 ACUTE OTITIS EXTERNA OF LEFT EAR, UNSPECIFIED TYPE: ICD-10-CM

## 2023-12-04 DIAGNOSIS — M79.652 PAIN OF LEFT THIGH: Primary | ICD-10-CM

## 2023-12-04 PROCEDURE — 1159F MED LIST DOCD IN RCRD: CPT | Performed by: FAMILY MEDICINE

## 2023-12-04 PROCEDURE — 1160F RVW MEDS BY RX/DR IN RCRD: CPT | Performed by: FAMILY MEDICINE

## 2023-12-04 PROCEDURE — 99213 OFFICE O/P EST LOW 20 MIN: CPT | Performed by: FAMILY MEDICINE

## 2023-12-04 RX ORDER — IBUPROFEN 800 MG/1
800 TABLET ORAL
COMMUNITY
Start: 2023-11-13

## 2023-12-04 RX ORDER — CIPROFLOXACIN AND DEXAMETHASONE 3; 1 MG/ML; MG/ML
4 SUSPENSION/ DROPS AURICULAR (OTIC) 2 TIMES DAILY
Qty: 7.5 ML | Refills: 0 | Status: SHIPPED | OUTPATIENT
Start: 2023-12-04

## 2023-12-04 NOTE — PROGRESS NOTES
Subjective   Catherine Nicholas is a 48 y.o. female.     History of Present Illness  Patient present with complaint of left thigh pain.  Symptoms noted for few weeks but pain got worse yesterday.  Pain located lateral thigh feel like sharp and shooting and moderate intensity.  She denies tingling and numbness in the leg or feet.  She is also complaining of left ear pain and fullness.  She denies fever, ear drainage, sinus pressure or headache.         The following portions of the patient's history were reviewed and updated as appropriate: past medical history, past social history, past surgical history and problem list.    Review of Systems   Constitutional:  Negative for fever.   HENT:  Positive for ear pain. Negative for ear discharge, hearing loss, sinus pressure and sore throat.    Musculoskeletal:  Positive for back pain.        Left thigh pain       Objective   Physical Exam  Vitals reviewed.   HENT:      Right Ear: Tympanic membrane, ear canal and external ear normal.      Left Ear: Tympanic membrane normal. Tenderness present. No drainage.      Ears:      Comments: Left ear external canal red  Musculoskeletal:      Right lower leg: No edema.      Left lower leg: No edema.      Comments: Positive tenderness at left lateral thigh.  No calf tenderness.   Neurological:      Mental Status: She is alert.       Vitals:    12/04/23 1520   BP: 115/77   Pulse: 58   Resp: 16   Temp: 97.3 °F (36.3 °C)   SpO2: 98%     Current Outpatient Medications on File Prior to Visit   Medication Sig Dispense Refill    furosemide (LASIX) 20 MG tablet Daily.      ibuprofen (ADVIL,MOTRIN) 800 MG tablet Take 1 tablet by mouth.      levothyroxine (SYNTHROID, LEVOTHROID) 125 MCG tablet Take 1 tablet by mouth Daily. 90 tablet 2    vitamin D (ERGOCALCIFEROL) 1.25 MG (23577 UT) capsule capsule Take 1 capsule by mouth 1 (One) Time Per Week. 5 capsule 4     No current facility-administered medications on file prior to visit.           Assessment &  Plan   Problems Addressed this Visit       Acute otitis externa of left ear    Relevant Medications    ciprofloxacin-dexAMETHasone (Ciprodex) 0.3-0.1 % otic suspension    Pain of left thigh - Primary     Advised symptom management heat, Tylenol and ibuprofen as needed.         Relevant Medications    ibuprofen (ADVIL,MOTRIN) 800 MG tablet     Diagnoses         Codes Comments    Pain of left thigh    -  Primary ICD-10-CM: M79.652  ICD-9-CM: 729.5     Acute otitis externa of left ear, unspecified type     ICD-10-CM: H60.502  ICD-9-CM: 380.10

## 2023-12-08 RX ORDER — LEVOTHYROXINE SODIUM 0.12 MG/1
125 TABLET ORAL DAILY
Qty: 90 TABLET | Refills: 0 | Status: SHIPPED | OUTPATIENT
Start: 2023-12-08

## 2023-12-08 NOTE — TELEPHONE ENCOUNTER
[address] [place] [address] - [phone] [place] [phone] FX    Relationship:     Best call back number: [phone]    Requested Prescriptions:   Requested Prescriptions     Pending Prescriptions Disp Refills    levothyroxine (SYNTHROID, LEVOTHROID) 125 MCG tablet [Pharmacy Med Name: Levothyroxine Sodium 125 MCG Oral Tablet] 90 tablet 0     Sig: Take 1 tablet by mouth once daily        Pharmacy where request should be sent: [address], [place] [address] - [phone] [place] [phone] FX     Last office visit with prescribing clinician: 12/4/2023   Last telemedicine visit with prescribing clinician: Visit date not found   Next office visit with prescribing clinician: 3/8/2024       Does the patient have less than a 3 day supply:  [x] Yes  [] No      Cornelia Sotelo Rep   12/08/23 14:01 EST

## 2024-01-17 ENCOUNTER — OFFICE VISIT (OUTPATIENT)
Dept: FAMILY MEDICINE CLINIC | Facility: CLINIC | Age: 49
End: 2024-01-17
Payer: MEDICAID

## 2024-01-17 VITALS
WEIGHT: 254 LBS | RESPIRATION RATE: 16 BRPM | TEMPERATURE: 97.3 F | HEIGHT: 64 IN | OXYGEN SATURATION: 97 % | BODY MASS INDEX: 43.36 KG/M2 | DIASTOLIC BLOOD PRESSURE: 70 MMHG | SYSTOLIC BLOOD PRESSURE: 110 MMHG | HEART RATE: 61 BPM

## 2024-01-17 DIAGNOSIS — M25.512 ACUTE PAIN OF LEFT SHOULDER: Primary | ICD-10-CM

## 2024-01-17 PROCEDURE — 1159F MED LIST DOCD IN RCRD: CPT | Performed by: NURSE PRACTITIONER

## 2024-01-17 PROCEDURE — 1160F RVW MEDS BY RX/DR IN RCRD: CPT | Performed by: NURSE PRACTITIONER

## 2024-01-17 PROCEDURE — 99213 OFFICE O/P EST LOW 20 MIN: CPT | Performed by: NURSE PRACTITIONER

## 2024-01-17 NOTE — PROGRESS NOTES
"Chief Complaint  Shoulder Pain (Left)    Subjective        Catherine Nicholas presents to [facility]  History of Present Illness    Left shoulder pain:   Onset of pain began 1 month ago. Severity of pain is mild to moderate. Frequency is constant. Status is worsening. Location is lateral left shoulder. There is no radiation. The quality of the pain is aching. There is no known injury. Reported recent use of walker in November; onset after. Right hand dominant.  Aggravating factors include reaching, lifting, side lying. Relieving factors include none.  Associated symptoms include nocturnal awaking, decreased mobility. Pertinent negatives include crepitus, joint tenderness, fever, bruising, difficulty initiating sleep, joint instability, locking, numbness/weakness, popping, spasms, swelling, tingling in arms.         Objective   Vital Signs:  /70 (BP Location: Right arm, Patient Position: Sitting, Cuff Size: Adult)   Pulse 61   Temp 97.3 °F (36.3 °C) (Infrared)   Resp 16   Ht 162.6 cm (64\")   Wt 115 kg (254 lb)   SpO2 97%   BMI 43.60 kg/m²   Estimated body mass index is 43.6 kg/m² as calculated from the following:    Height as of this encounter: 162.6 cm (64\").    Weight as of this encounter: 115 kg (254 lb).             Physical Exam  Constitutional:       Appearance: She is obese.   Pulmonary:      Effort: Pulmonary effort is normal.   Musculoskeletal:      Left shoulder: Tenderness present. No swelling. Decreased range of motion. Decreased strength. Normal pulse.      Comments: Tenderness over left glenohumeral joint.  Verbalized pain in left shoulder with left arm abduction at 90 degrees.    Neurological:      Mental Status: She is alert and oriented to person, place, and time.      Sensory: Sensation is intact.   Psychiatric:         Mood and Affect: Mood normal.         Thought Content: Thought content normal.        Result Review :                     Assessment and Plan "     Diagnoses and all orders for this visit:    1. Acute pain of left shoulder (Primary)  -     XR Shoulder 2+ View Left; Future  -     Ambulatory Referral to Orthopedic Surgery             Follow Up     Return if symptoms worsen or fail to improve.  Patient was given instructions and counseling regarding her condition or for health maintenance advice. Please see specific information pulled into the AVS if appropriate.

## 2024-01-24 ENCOUNTER — OFFICE VISIT (OUTPATIENT)
Dept: ORTHOPEDIC SURGERY | Facility: CLINIC | Age: 49
End: 2024-01-24
Payer: MEDICAID

## 2024-01-24 VITALS — BODY MASS INDEX: 43.36 KG/M2 | HEART RATE: 61 BPM | OXYGEN SATURATION: 98 % | WEIGHT: 254 LBS | HEIGHT: 64 IN

## 2024-01-24 DIAGNOSIS — M75.82 ROTATOR CUFF TENDINITIS, LEFT: ICD-10-CM

## 2024-01-24 DIAGNOSIS — M25.512 ACUTE PAIN OF LEFT SHOULDER: Primary | ICD-10-CM

## 2024-01-24 DIAGNOSIS — M75.42 SUBACROMIAL IMPINGEMENT OF LEFT SHOULDER: ICD-10-CM

## 2024-01-24 RX ORDER — MELOXICAM 15 MG/1
15 TABLET ORAL DAILY
Qty: 30 TABLET | Refills: 2 | Status: SHIPPED | OUTPATIENT
Start: 2024-01-24

## 2024-01-24 RX ADMIN — TRIAMCINOLONE ACETONIDE 40 MG: 40 INJECTION, SUSPENSION INTRA-ARTICULAR; INTRAMUSCULAR at 14:14

## 2024-01-24 RX ADMIN — LIDOCAINE HYDROCHLORIDE 4 ML: 10 INJECTION, SOLUTION EPIDURAL; INFILTRATION; INTRACAUDAL; PERINEURAL at 14:14

## 2024-01-24 NOTE — PROGRESS NOTES
NEW VISIT    Patient: Catherine Nicholas  ?  YOB: 1975    MRN: [id number]  ?  Chief Complaint   Patient presents with    Left Shoulder - Initial Evaluation      ?  HPI: Patient is a 48-year-old female presents today for acute left shoulder pain.  She denies any acute injury.  States symptoms started at the beginning of December.  Only potential enticing event where she states she was having to utilize a walker during the middle of November secondary to immobilization from alternative lower leg injury.  Noticed after that time had increased shoulder symptoms.  Pain is primarily over the lateral aspect of the shoulder, and is worse with overhead, push pull, or heavy lifting activities.  She denies significant weakness.  She denies numbness or tingling.  She denies any locking catching or popping.  No prior shoulder injuries or surgeries.  Treatment at this time includes activity modification.  No regular medications, injections or formal physical therapy.    Allergies: No Known Allergies    Past Medical History:   Diagnosis Date    Bradycardia     Frozen shoulder December 2023    Not sure if its a frozen shoulder but it hurts to more it or use it    Hypothyroidism     Palpitation      Past Surgical History:   Procedure Laterality Date    BREAST BIOPSY       Social History     Occupational History    Not on file   Tobacco Use    Smoking status: Former     Packs/day: 1.00     Years: 15.00     Additional pack years: 0.00     Total pack years: 15.00     Types: Cigarettes    Smokeless tobacco: Never    Tobacco comments:     I smoked for almost 20 years quit in 2009   Vaping Use    Vaping Use: Never used   Substance and Sexual Activity    Alcohol use: No    Drug use: Never    Sexual activity: Yes     Partners: Female, Male     Birth control/protection: Tubal ligation      Social History     Social History Narrative    Not on file     Family History   Problem Relation Age of Onset    No Known Problems Mother     No  "Known Problems Father        Review of Systems  Constitutional: Negative.  Negative for fever.   Musculoskeletal: Positive for joint pain  Skin: Negative.  Negative for rash and wound.    Neurological: Negative for numbness.     Vitals:    01/24/24 1344   Pulse: 61   SpO2: 98%   Weight: 115 kg (254 lb)   Height: 162.6 cm (64\")        Physical Exam  Constitutional: Patient is oriented to person, place, and time. Appears well-developed and well-nourished.   Head: Normocephalic and atraumatic.   Pulmonary/Chest: Effort normal.   Musculoskeletal:   See detailed exam below   Neurological: Alert and oriented to person, place, and time. No sensory deficit. Coordination normal.   Skin: Skin is warm and dry. Capillary refill takes less than 2 seconds. No rash noted. No erythema.     The left shoulder is without obvious signs of acute bony deformity, swelling, erythema or ecchymosis. There is no tenderness along the joint line. There is no tenderness at the AC joint. There is no tenderness at the SC joint. Active range of motion is limited, with painful arc.  Passive range of motion is limited,  with painful arc. Impingement signs are positive with Neer, Black, and crossover tests. Instability tests are negative with anterior and posterior drawer, and sulcus test. Speeds and Yergason's tests are negative. [place]'s test is positive for pain. Chelsea's test is positive for pain.  Rotator cuff strength is 5/5 and painful. Scapular function is normal.  The neck and opposite shoulder are otherwise normal and stable.       Diagnostics:  Reviewed radiology report of xrays of left shoulder, from [facility] on 1/17/2024, summary of impression below:     No acute osseous abnormality.  Normal alignment.  Minimal degenerative change at the AC joint.      Assessment:  Diagnoses and all orders for this visit:    1. Acute pain of left shoulder (Primary)  -     meloxicam (Mobic) 15 MG tablet; Take 1 tablet by mouth Daily.  Dispense: " 30 tablet; Refill: 2  -     Large Joint Arthrocentesis: L subacromial bursa    2. Subacromial impingement of left shoulder  -     meloxicam (Mobic) 15 MG tablet; Take 1 tablet by mouth Daily.  Dispense: 30 tablet; Refill: 2  -     Large Joint Arthrocentesis: L subacromial bursa    3. Rotator cuff tendinitis, left  -     meloxicam (Mobic) 15 MG tablet; Take 1 tablet by mouth Daily.  Dispense: 30 tablet; Refill: 2        Plan    Above diagnosis and plan discussed with patient in office today.  Prior x-rays reviewed from [facility] and negative for acute osseous abnormalities mild degenerative change at the AC joint.  Patient with significant impingement signs on exam today, although with preserved rotator cuff strength.  Minimal symptoms at AC joint.  No noted mechanical symptoms.  Findings consistent with likely subacromial impingement, and rotator cuff tendinitis.  We discussed conservative options and elected proceed with subacromial corticosteroid injection which was given as noted below.  We will also start her on as needed oral Mobic, and she was given home exercises for shoulder stability and strengthening.  Plan on follow-up in 4 months to monitor progress with conservative management.  If she has continued symptoms we will likely revisit formal physical therapy further imaging as needed.  Subacromial corticosteroid injection injection discussed and given as noted below  Physical Therapy discussed.  Start with home exercises which were given to her and print out in office today  Activity modifications discussed and recommended.  Activities as tolerated.  Limit heavy lifting or overhead activities as able  Rest, ice, compression, and elevation (RICE) therapy  Stretching and strengthening exercises  Prescription Mobic as noted above.  Discussed taking more regularly for the next 1 to 2 weeks then as needed afterwards  Follow up in 4 month(s)    Large Joint Arthrocentesis: L subacromial bursa  Date/Time:  1/24/2024 2:14 PM  Consent given by: patient  Site marked: site marked  Timeout: Immediately prior to procedure a time out was called to verify the correct patient, procedure, equipment, support staff and site/side marked as required   Supporting Documentation  Indications: pain   Procedure Details  Location: shoulder - L subacromial bursa  Preparation: Patient was prepped and draped in the usual sterile fashion  Needle size: 25 G  Approach: posterior  Medications administered: 40 mg triamcinolone acetonide 40 MG/ML; 4 mL lidocaine PF 1% 1 %  Patient tolerance: patient tolerated the procedure well with no immediate complications        Date of encounter: 01/24/2024   Sterling Frey DO    Electronically signed by Sterling Frey DO, 01/24/24, 1:50 PM EST.    Disclaimer: Please note that areas of this note were completed with computer voice recognition software.  Quite often unanticipated grammatical, syntax, homophones, and other interpretive errors are inadvertently transcribed by the computer software. Please excuse any errors that have escaped final proofreading.

## 2024-01-25 RX ORDER — LIDOCAINE HYDROCHLORIDE 10 MG/ML
4 INJECTION, SOLUTION EPIDURAL; INFILTRATION; INTRACAUDAL; PERINEURAL
Status: COMPLETED | OUTPATIENT
Start: 2024-01-24 | End: 2024-01-24

## 2024-01-25 RX ORDER — TRIAMCINOLONE ACETONIDE 40 MG/ML
40 INJECTION, SUSPENSION INTRA-ARTICULAR; INTRAMUSCULAR
Status: COMPLETED | OUTPATIENT
Start: 2024-01-24 | End: 2024-01-24

## 2024-01-26 ENCOUNTER — PATIENT ROUNDING (BHMG ONLY) (OUTPATIENT)
Dept: ORTHOPEDIC SURGERY | Facility: CLINIC | Age: 49
End: 2024-01-26
Payer: MEDICAID

## 2024-03-08 ENCOUNTER — OFFICE VISIT (OUTPATIENT)
Dept: FAMILY MEDICINE CLINIC | Facility: CLINIC | Age: 49
End: 2024-03-08
Payer: MEDICAID

## 2024-03-08 ENCOUNTER — LAB (OUTPATIENT)
Dept: FAMILY MEDICINE CLINIC | Facility: CLINIC | Age: 49
End: 2024-03-08
Payer: MEDICAID

## 2024-03-08 VITALS
DIASTOLIC BLOOD PRESSURE: 86 MMHG | HEIGHT: 64 IN | HEART RATE: 52 BPM | TEMPERATURE: 96.6 F | BODY MASS INDEX: 43.36 KG/M2 | OXYGEN SATURATION: 97 % | SYSTOLIC BLOOD PRESSURE: 117 MMHG | WEIGHT: 254 LBS | RESPIRATION RATE: 16 BRPM

## 2024-03-08 DIAGNOSIS — E55.9 VITAMIN D DEFICIENCY: ICD-10-CM

## 2024-03-08 DIAGNOSIS — Z12.31 SCREENING MAMMOGRAM FOR BREAST CANCER: ICD-10-CM

## 2024-03-08 DIAGNOSIS — Z00.00 ENCOUNTER FOR WELL ADULT EXAM WITHOUT ABNORMAL FINDINGS: ICD-10-CM

## 2024-03-08 DIAGNOSIS — E03.9 HYPOTHYROIDISM, UNSPECIFIED TYPE: ICD-10-CM

## 2024-03-08 DIAGNOSIS — Z00.00 ENCOUNTER FOR WELL ADULT EXAM WITHOUT ABNORMAL FINDINGS: Primary | ICD-10-CM

## 2024-03-08 LAB
25(OH)D3 SERPL-MCNC: 31.8 NG/ML (ref 30–100)
ALBUMIN SERPL-MCNC: 4.2 G/DL (ref 3.5–5.2)
ALBUMIN/GLOB SERPL: 1.6 G/DL
ALP SERPL-CCNC: 120 U/L (ref 39–117)
ALT SERPL W P-5'-P-CCNC: 10 U/L (ref 1–33)
ANION GAP SERPL CALCULATED.3IONS-SCNC: 10 MMOL/L (ref 5–15)
AST SERPL-CCNC: 28 U/L (ref 1–32)
BILIRUB SERPL-MCNC: 0.3 MG/DL (ref 0–1.2)
BUN SERPL-MCNC: 16 MG/DL (ref 6–20)
BUN/CREAT SERPL: 21.6 (ref 7–25)
CALCIUM SPEC-SCNC: 9.5 MG/DL (ref 8.6–10.5)
CHLORIDE SERPL-SCNC: 105 MMOL/L (ref 98–107)
CHOLEST SERPL-MCNC: 178 MG/DL (ref 0–200)
CO2 SERPL-SCNC: 26 MMOL/L (ref 22–29)
CREAT SERPL-MCNC: 0.74 MG/DL (ref 0.57–1)
EGFRCR SERPLBLD CKD-EPI 2021: 99.3 ML/MIN/1.73
GLOBULIN UR ELPH-MCNC: 2.6 GM/DL
GLUCOSE SERPL-MCNC: 91 MG/DL (ref 65–99)
HDLC SERPL-MCNC: 59 MG/DL (ref 40–60)
LDLC SERPL CALC-MCNC: 102 MG/DL (ref 0–100)
LDLC/HDLC SERPL: 1.69 {RATIO}
POTASSIUM SERPL-SCNC: 4.6 MMOL/L (ref 3.5–5.2)
PROT SERPL-MCNC: 6.8 G/DL (ref 6–8.5)
SODIUM SERPL-SCNC: 141 MMOL/L (ref 136–145)
TRIGL SERPL-MCNC: 96 MG/DL (ref 0–150)
TSH SERPL DL<=0.05 MIU/L-ACNC: 1.47 UIU/ML (ref 0.27–4.2)
VLDLC SERPL-MCNC: 17 MG/DL (ref 5–40)

## 2024-03-08 PROCEDURE — 80053 COMPREHEN METABOLIC PANEL: CPT | Performed by: FAMILY MEDICINE

## 2024-03-08 PROCEDURE — 36415 COLL VENOUS BLD VENIPUNCTURE: CPT

## 2024-03-08 PROCEDURE — 84443 ASSAY THYROID STIM HORMONE: CPT | Performed by: FAMILY MEDICINE

## 2024-03-08 PROCEDURE — 82306 VITAMIN D 25 HYDROXY: CPT | Performed by: FAMILY MEDICINE

## 2024-03-08 PROCEDURE — 80061 LIPID PANEL: CPT | Performed by: FAMILY MEDICINE

## 2024-03-08 RX ORDER — LEVOTHYROXINE SODIUM 0.12 MG/1
125 TABLET ORAL DAILY
Qty: 90 TABLET | Refills: 3 | Status: SHIPPED | OUTPATIENT
Start: 2024-03-08

## 2024-03-08 NOTE — PROGRESS NOTES
Subjective   Catherine Nicholas is a 48 y.o. female.     History of Present Illness     The patient comes in today for annual physical.  The patient is doing well denies fatigue, cold intolerance, headache, cough, chest pain, palpitations, dizziness , abdominal pain, anxiety, trouble swallowing and SOB. The patient admits to dietary compliance is  fair  and exercising occasionally.  She is a non-smoker.       The following portions of the patient's history were reviewed and updated as appropriate: past medical history, past social history, past surgical history and problem list.    Review of Systems   Constitutional:  Negative for activity change, appetite change and fatigue.   Respiratory:  Negative for shortness of breath.    Cardiovascular:  Negative for chest pain and palpitations.   Gastrointestinal:  Negative for abdominal pain.   Endocrine: Negative for cold intolerance.   Neurological:  Negative for headache.   Psychiatric/Behavioral:  Negative for sleep disturbance. The patient is not nervous/anxious.        Objective   Physical Exam  Vitals reviewed.   Constitutional:       Appearance: She is well-developed.   Neck:      Thyroid: No thyromegaly.   Cardiovascular:      Heart sounds: Normal heart sounds.   Pulmonary:      Effort: Pulmonary effort is normal.      Breath sounds: Normal breath sounds. No wheezing.   Abdominal:      Tenderness: There is no abdominal tenderness.   Musculoskeletal:      Cervical back: Neck supple. No tenderness.   Neurological:      Mental Status: She is alert and oriented to person, place, and time.   Psychiatric:         Mood and Affect: Mood normal.         Vitals:    03/08/24 0826   BP: 117/86   Pulse: 52   Resp: 16   Temp: 96.6 °F (35.9 °C)   SpO2: 97%   Body mass index is 43.6 kg/m².  Class 3 Severe Obesity (BMI >=40). Obesity-related health conditions include the following: Hypothyroidism. Obesity is unchanged. BMI is is above average; BMI management plan is completed. We  discussed portion control and increasing exercise.   Current Outpatient Medications on File Prior to Visit   Medication Sig Dispense Refill    furosemide (LASIX) 20 MG tablet Daily.      meloxicam (Mobic) 15 MG tablet Take 1 tablet by mouth Daily. 30 tablet 2    vitamin D (ERGOCALCIFEROL) 1.25 MG (28095 UT) capsule capsule Take 1 capsule by mouth 1 (One) Time Per Week. 5 capsule 4    [DISCONTINUED] ciprofloxacin-dexAMETHasone (Ciprodex) 0.3-0.1 % otic suspension Administer 4 drops into the left ear 2 (Two) Times a Day. 7.5 mL 0    [DISCONTINUED] levothyroxine (SYNTHROID, LEVOTHROID) 125 MCG tablet Take 1 tablet by mouth once daily 90 tablet 0     No current facility-administered medications on file prior to visit.         Assessment & Plan   Problems Addressed this Visit       Hypothyroidism     Stable on levothyroxine.         Relevant Medications    levothyroxine (SYNTHROID, LEVOTHROID) 125 MCG tablet    Encounter for well adult exam without abnormal findings - Primary     Discussed healthy diet and  importance of regular exercise. Stressed importance of moderation in sodium/caffeine intake,  cholesterol, caloric balance, sufficient intake of fresh fruits and vegetables.           Relevant Orders    TSH    Lipid panel    Comprehensive metabolic panel    Screening mammogram for breast cancer    Relevant Orders    Mammo Screening Digital Tomosynthesis Bilateral With CAD     Other Visit Diagnoses       Vitamin D deficiency        Relevant Orders    Vitamin D 25 hydroxy          Diagnoses         Codes Comments    Encounter for well adult exam without abnormal findings    -  Primary ICD-10-CM: Z00.00  ICD-9-CM: V70.0     Vitamin D deficiency     ICD-10-CM: E55.9  ICD-9-CM: 268.9     Screening mammogram for breast cancer     ICD-10-CM: Z12.31  ICD-9-CM: V76.12     Hypothyroidism, unspecified type     ICD-10-CM: E03.9  ICD-9-CM: 244.9

## 2024-03-21 ENCOUNTER — HOSPITAL ENCOUNTER (OUTPATIENT)
Dept: MAMMOGRAPHY | Facility: HOSPITAL | Age: 49
Discharge: HOME | End: 2024-03-21
Admitting: FAMILY MEDICINE
Payer: MEDICAID

## 2024-03-21 PROCEDURE — 77067 SCR MAMMO BI INCL CAD: CPT

## 2024-03-21 PROCEDURE — 77063 BREAST TOMOSYNTHESIS BI: CPT

## 2024-04-21 DIAGNOSIS — M75.42 SUBACROMIAL IMPINGEMENT OF LEFT SHOULDER: ICD-10-CM

## 2024-04-21 DIAGNOSIS — M25.512 ACUTE PAIN OF LEFT SHOULDER: ICD-10-CM

## 2024-04-21 DIAGNOSIS — M75.82 ROTATOR CUFF TENDINITIS, LEFT: ICD-10-CM

## 2024-04-22 RX ORDER — MELOXICAM 15 MG/1
15 TABLET ORAL DAILY
Qty: 30 TABLET | Refills: 0 | Status: SHIPPED | OUTPATIENT
Start: 2024-04-22

## 2024-05-21 ENCOUNTER — TELEPHONE (OUTPATIENT)
Dept: FAMILY MEDICINE CLINIC | Facility: CLINIC | Age: 49
End: 2024-05-21

## 2024-05-21 NOTE — TELEPHONE ENCOUNTER
Caller: Catherine Nicholas    Relationship to patient: Self    Best call back number:     [email or web address] (Self) [phone] (Mobile)       Chief complaint: CHEST PAIN AND SHORTNESS OF BREATH    TESTED NEGATIVE FOR COVID, BUT HAS THE SYMPTOMS     Patient directed to call 911 or go to their nearest emergency room.     Patient verbalized understanding: [x] Yes  [] No  If no, why?    Additional notes:

## 2024-05-22 NOTE — TELEPHONE ENCOUNTER
Left detailed voicemail stating she needs to go to the [facility] and to call back if she has any other concerns or questions on 5/22 @ 7:48AM.

## 2024-05-28 RX ORDER — ERGOCALCIFEROL 1.25 MG/1
50000 CAPSULE ORAL WEEKLY
Qty: 5 CAPSULE | Refills: 4 | Status: SHIPPED | OUTPATIENT
Start: 2024-05-28

## 2024-06-28 ENCOUNTER — OFFICE VISIT (OUTPATIENT)
Dept: FAMILY MEDICINE CLINIC | Facility: CLINIC | Age: 49
End: 2024-06-28
Payer: MEDICAID

## 2024-06-28 ENCOUNTER — LAB (OUTPATIENT)
Dept: FAMILY MEDICINE CLINIC | Facility: CLINIC | Age: 49
End: 2024-06-28
Payer: MEDICAID

## 2024-06-28 VITALS
OXYGEN SATURATION: 98 % | SYSTOLIC BLOOD PRESSURE: 100 MMHG | HEIGHT: 64 IN | WEIGHT: 253 LBS | TEMPERATURE: 96.8 F | RESPIRATION RATE: 16 BRPM | BODY MASS INDEX: 43.19 KG/M2 | DIASTOLIC BLOOD PRESSURE: 61 MMHG | HEART RATE: 54 BPM

## 2024-06-28 DIAGNOSIS — E03.9 HYPOTHYROIDISM, UNSPECIFIED TYPE: ICD-10-CM

## 2024-06-28 DIAGNOSIS — R40.0 SOMNOLENCE, DAYTIME: ICD-10-CM

## 2024-06-28 DIAGNOSIS — R53.83 FATIGUE, UNSPECIFIED TYPE: ICD-10-CM

## 2024-06-28 DIAGNOSIS — R53.83 FATIGUE, UNSPECIFIED TYPE: Primary | ICD-10-CM

## 2024-06-28 DIAGNOSIS — R06.02 SOB (SHORTNESS OF BREATH) ON EXERTION: ICD-10-CM

## 2024-06-28 LAB
ANION GAP SERPL CALCULATED.3IONS-SCNC: 7 MMOL/L (ref 5–15)
BASOPHILS # BLD AUTO: 0.05 10*3/MM3 (ref 0–0.2)
BASOPHILS NFR BLD AUTO: 0.7 % (ref 0–1.5)
BUN SERPL-MCNC: 18 MG/DL (ref 6–20)
BUN/CREAT SERPL: 22.8 (ref 7–25)
CALCIUM SPEC-SCNC: 9.4 MG/DL (ref 8.6–10.5)
CHLORIDE SERPL-SCNC: 106 MMOL/L (ref 98–107)
CO2 SERPL-SCNC: 28 MMOL/L (ref 22–29)
CREAT SERPL-MCNC: 0.79 MG/DL (ref 0.57–1)
DEPRECATED RDW RBC AUTO: 41.9 FL (ref 37–54)
EGFRCR SERPLBLD CKD-EPI 2021: 91.8 ML/MIN/1.73
EOSINOPHIL # BLD AUTO: 0.23 10*3/MM3 (ref 0–0.4)
EOSINOPHIL NFR BLD AUTO: 3.2 % (ref 0.3–6.2)
ERYTHROCYTE [DISTWIDTH] IN BLOOD BY AUTOMATED COUNT: 13.9 % (ref 12.3–15.4)
GLUCOSE SERPL-MCNC: 91 MG/DL (ref 65–99)
HCT VFR BLD AUTO: 39.4 % (ref 34–46.6)
HGB BLD-MCNC: 12.7 G/DL (ref 12–15.9)
IMM GRANULOCYTES # BLD AUTO: 0.01 10*3/MM3 (ref 0–0.05)
IMM GRANULOCYTES NFR BLD AUTO: 0.1 % (ref 0–0.5)
LYMPHOCYTES # BLD AUTO: 2.72 10*3/MM3 (ref 0.7–3.1)
LYMPHOCYTES NFR BLD AUTO: 38.3 % (ref 19.6–45.3)
MCH RBC QN AUTO: 27.3 PG (ref 26.6–33)
MCHC RBC AUTO-ENTMCNC: 32.2 G/DL (ref 31.5–35.7)
MCV RBC AUTO: 84.5 FL (ref 79–97)
MONOCYTES # BLD AUTO: 0.46 10*3/MM3 (ref 0.1–0.9)
MONOCYTES NFR BLD AUTO: 6.5 % (ref 5–12)
NEUTROPHILS NFR BLD AUTO: 3.63 10*3/MM3 (ref 1.7–7)
NEUTROPHILS NFR BLD AUTO: 51.2 % (ref 42.7–76)
NRBC BLD AUTO-RTO: 0 /100 WBC (ref 0–0.2)
PLATELET # BLD AUTO: 347 10*3/MM3 (ref 140–450)
PMV BLD AUTO: 9.6 FL (ref 6–12)
POTASSIUM SERPL-SCNC: 4.5 MMOL/L (ref 3.5–5.2)
RBC # BLD AUTO: 4.66 10*6/MM3 (ref 3.77–5.28)
SODIUM SERPL-SCNC: 141 MMOL/L (ref 136–145)
T4 FREE SERPL-MCNC: 1.58 NG/DL (ref 0.92–1.68)
TSH SERPL DL<=0.05 MIU/L-ACNC: 4.29 UIU/ML (ref 0.27–4.2)
WBC NRBC COR # BLD AUTO: 7.1 10*3/MM3 (ref 3.4–10.8)

## 2024-06-28 PROCEDURE — 84439 ASSAY OF FREE THYROXINE: CPT | Performed by: FAMILY MEDICINE

## 2024-06-28 PROCEDURE — 85025 COMPLETE CBC W/AUTO DIFF WBC: CPT | Performed by: FAMILY MEDICINE

## 2024-06-28 PROCEDURE — 80048 BASIC METABOLIC PNL TOTAL CA: CPT | Performed by: FAMILY MEDICINE

## 2024-06-28 PROCEDURE — 99214 OFFICE O/P EST MOD 30 MIN: CPT | Performed by: FAMILY MEDICINE

## 2024-06-28 PROCEDURE — 1159F MED LIST DOCD IN RCRD: CPT | Performed by: FAMILY MEDICINE

## 2024-06-28 PROCEDURE — 1160F RVW MEDS BY RX/DR IN RCRD: CPT | Performed by: FAMILY MEDICINE

## 2024-06-28 PROCEDURE — 36415 COLL VENOUS BLD VENIPUNCTURE: CPT

## 2024-06-28 PROCEDURE — 84443 ASSAY THYROID STIM HORMONE: CPT | Performed by: FAMILY MEDICINE

## 2024-06-28 RX ORDER — ALBUTEROL SULFATE 90 UG/1
2 AEROSOL, METERED RESPIRATORY (INHALATION) EVERY 4 HOURS PRN
Qty: 18 G | Refills: 3 | Status: SHIPPED | OUTPATIENT
Start: 2024-06-28

## 2024-06-28 NOTE — PROGRESS NOTES
Subjective   Catherine Nicholas is a 48 y.o. female.     History of Present Illness  48-year-old female patient present with complaint of fatigue and shortness of breath.  She does have on and off symptoms of chronic fatigue where she feels no motivation or lack of energy.  Also 1 to 2 weeks ago she has noticed shortness of breath with exertion or going up to the steps.  She has to rest to catch her breath.  She denies chest pain, cough or wheezing.  She is also complaining of daytime somnolence and being tired after a good night sleep.       The following portions of the patient's history were reviewed and updated as appropriate: past medical history, past social history, past surgical history and problem list.    Review of Systems   Constitutional:  Positive for activity change and fatigue. Negative for appetite change.   Respiratory:  Positive for shortness of breath. Negative for cough, chest tightness and wheezing.    Cardiovascular:  Positive for palpitations. Negative for chest pain and leg swelling.   Endocrine: Negative for cold intolerance.   Neurological:  Negative for syncope.       Objective   Physical Exam  Vitals reviewed.   Constitutional:       General: She is not in acute distress.     Appearance: She is well-developed.   Neck:      Thyroid: No thyromegaly.   Cardiovascular:      Heart sounds: Normal heart sounds.   Pulmonary:      Effort: Pulmonary effort is normal.      Breath sounds: Normal breath sounds. No wheezing.   Musculoskeletal:      Cervical back: No tenderness.   Neurological:      Mental Status: She is alert and oriented to person, place, and time.         Vitals:    06/28/24 0856   BP: 100/61   Pulse: 54   Resp: 16   Temp: 96.8 °F (36 °C)   SpO2: 98%     Current Outpatient Medications on File Prior to Visit   Medication Sig Dispense Refill    levothyroxine (SYNTHROID, LEVOTHROID) 125 MCG tablet Take 1 tablet by mouth Daily. 90 tablet 3    vitamin D (ERGOCALCIFEROL) 1.25 MG (72346 UT)  capsule capsule Take 1 capsule by mouth 1 (One) Time Per Week. 5 capsule 4    [DISCONTINUED] furosemide (LASIX) 20 MG tablet Daily. (Patient not taking: Reported on 6/28/2024)      [DISCONTINUED] meloxicam (MOBIC) 15 MG tablet Take 1 tablet by mouth once daily (Patient not taking: Reported on 6/28/2024) 30 tablet 0     No current facility-administered medications on file prior to visit.         Assessment & Plan   Problems Addressed this Visit       Hypothyroidism     Stable-will check TSH and free T4 level continue current dose of levothyroxine.         Relevant Orders    TSH    T4, free    CBC w AUTO Differential    Fatigue - Primary    Relevant Orders    TSH    T4, free    Basic metabolic panel    CBC w AUTO Differential    SOB (shortness of breath) on exertion    Relevant Medications    albuterol sulfate  (90 Base) MCG/ACT inhaler    Other Relevant Orders    XR Chest PA & Lateral    Somnolence, daytime    Relevant Orders    Ambulatory Referral to Sleep Medicine     Diagnoses         Codes Comments    Fatigue, unspecified type    -  Primary ICD-10-CM: R53.83  ICD-9-CM: 780.79     SOB (shortness of breath) on exertion     ICD-10-CM: R06.02  ICD-9-CM: 786.05     Somnolence, daytime     ICD-10-CM: R40.0  ICD-9-CM: 780.54     Hypothyroidism, unspecified type     ICD-10-CM: E03.9  ICD-9-CM: 244.9

## 2024-07-01 NOTE — PROGRESS NOTES
Patient notified verbally on 7/1 @ 1:45PM and she states since her thyroid was high, what does she need to do? [place] advise.

## 2024-07-03 DIAGNOSIS — R53.83 FATIGUE, UNSPECIFIED TYPE: ICD-10-CM

## 2024-07-03 DIAGNOSIS — R00.1 BRADYCARDIA: Primary | ICD-10-CM

## 2024-07-03 RX ORDER — LEVOTHYROXINE SODIUM 137 UG/1
125 TABLET ORAL DAILY
Qty: 90 TABLET | Refills: 3 | Status: SHIPPED | OUTPATIENT
Start: 2024-07-03

## 2024-07-08 NOTE — PROGRESS NOTES
Subjective:     Encounter Date:07/09/2024      Patient ID: Catherine Nicholas is a 48 y.o. female.    Chief Complaint and history of present illness:     Follow-up for bradycardia, palpitations, obesity with BMI over 40  History of Present Illness          Ms. Catherine Nicholas has PMH of     Bradycardia  Morbid obesity BMI over 42  Hypothyroidism, on levothyroxine therapy  Former smoker  Tubal ligation  Family history negative for premature CAD, NKDA     Here for follow-up.  Patient is complaining of feeling tired and fatigued and sleeping all day.  Has occasional shortness of breath where she has to sigh and take deep breaths.  Denies any chest pain.    Patient was seen for evaluation of palpitations fatigue and neck pain and dyspnea on exertion was found to be bradycardic.  Work-up revealed Holter monitor 10/15/2021 with heart rates from 37-89 mean of 55 bpm.  Echo 10/28/2021 revealed EF of 60 to 60% with PA systolic pressure of 20 mmHg.     Review of records reveal labs from 9/21/2021 revealed normal TSH, BMP, cholesterol 175, triglycerides 179, HDL 54, .  Labs from 3/8/2024 reveal normal TSH.  Lipid profile with cholesterol 178, triglycerides 96, HDL 59, .        Assessment:  :     Fatigue  Bradycardia  Shortness of air  Dyspnea on exertion  Obesity with BMI of over 42  Hypothyroidism        Recommendations / Plan:         Patient's most of her bradycardic episodes are running sleep.  Currently her heart rate is 60 bpm.  Will benefit from sleep apnea evaluation.  Advised her to see PMD for the same.  Will check an echo to assess dyspnea and fatigue.  Reviewed BMI over 40, counseled on weight loss diet and exercise.  Follow-up with PMD for hypothyroidism.  We will follow up and consider further evaluation treatment.  Reviewed EKG results with patient.        Procedures  Event monitor 10/27/2021 revealed heart rate of 37 bpm at 6 AM    Echocardiogram 10/28/2021 reveals normal LV systolic function, LVEF of  60 to 65%.  Normal RV systolic pressure is 16 mmHg.        ECG 12 Lead    Date/Time: 7/9/2024 12:41 PM  Performed by: Isaias Chau MD    Authorized by: Isaias Chau MD  Comparison: compared with previous ECG from 10/15/2021  Comparison to previous ECG: EKG done today reviewed/interpreted by me reveals sinus bradycardia at the rate 56 bpm, Barbara pattern in V1 V2.  No significant change compared EKG from 10/15/2021.          Copied text in this portion of the note has been reviewed and is accurate as of 7/9/2024  The following portions of the patient's history were reviewed and updated as appropriate: allergies, current medications, past family history, past medical history, past social history, past surgical history and problem list.    Assessment:         [place]       Diagnosis Plan   1. Shortness of breath  Adult Transthoracic Echo Complete W/ Cont if Necessary Per Protocol      2. Bradycardia  Adult Transthoracic Echo Complete W/ Cont if Necessary Per Protocol      3. Morbid obesity with BMI of 40.0-44.9, adult  Adult Transthoracic Echo Complete W/ Cont if Necessary Per Protocol      4. Fatigue, unspecified type  Adult Transthoracic Echo Complete W/ Cont if Necessary Per Protocol             Plan:               Past Medical History:  Past Medical History:   Diagnosis Date    Bradycardia     Frozen shoulder December 2023    Not sure if its a frozen shoulder but it hurts to more it or use it    Hypothyroidism     Palpitation      Past Surgical History:  Past Surgical History:   Procedure Laterality Date    BREAST BIOPSY        Allergies:  No Known Allergies  Home Meds:  Current Meds:     Current Outpatient Medications:     cetirizine (zyrTEC) 10 MG tablet, Take 1 tablet by mouth Daily., Disp: , Rfl:     levothyroxine (SYNTHROID, LEVOTHROID) 137 MCG tablet, Take 1 tablet by mouth Daily., Disp: 90 tablet, Rfl: 3    PATIENT SUPPLIED ALLERGY INJECTION, Inject  under the skin into the appropriate  "area as directed 1 (One) Time., Disp: , Rfl:     vitamin D (ERGOCALCIFEROL) 1.25 MG (84945 UT) capsule capsule, Take 1 capsule by mouth 1 (One) Time Per Week., Disp: 5 capsule, Rfl: 4    albuterol sulfate  (90 Base) MCG/ACT inhaler, Inhale 2 puffs Every 4 (Four) Hours As Needed for Shortness of Air. (Patient not taking: Reported on 7/9/2024), Disp: 18 g, Rfl: 3  Social History:   Social History     Tobacco Use    Smoking status: Former     Current packs/day: 1.00     Average packs/day: 1 pack/day for 15.0 years (15.0 ttl pk-yrs)     Types: Cigarettes    Smokeless tobacco: Never    Tobacco comments:     I smoked for almost 20 years quit in 2009   Substance Use Topics    Alcohol use: No      Family History:  Family History   Problem Relation Age of Onset    No Known Problems Mother     No Known Problems Father               Review of Systems   Constitutional: Positive for malaise/fatigue.   Cardiovascular:  Positive for palpitations. Negative for chest pain and leg swelling.   Respiratory:  Negative for shortness of breath.    Skin:  Negative for rash.   Neurological:  Negative for dizziness, light-headedness and numbness.     All other systems are negative         Objective:     Physical Exam  BP 99/68   Pulse 60   Ht 162.6 cm (64\")   Wt 115 kg (254 lb)   SpO2 98%   BMI 43.60 kg/m²   General:  Appears in no acute distress  Eyes: Sclera is anicteric,  conjunctiva is clear   HEENT:  No JVD.  No carotid bruits  Respiratory: Respirations regular and unlabored at rest.  Clear to auscultation  Cardiovascular: S1,S2 Regular rate and rhythm. .   Extremities: No digital clubbing or cyanosis, no edema  Skin: Color pink. Skin warm and dry to touch. No rashes  No xanthoma  Neuro: Alert and awake.    Lab Reviewed:         Isaias Chau MD  7/9/2024 12:42 EDT      EMR Dragon/Transcription:   \"Dictated utilizing Dragon dictation\".        "

## 2024-07-09 ENCOUNTER — OFFICE VISIT (OUTPATIENT)
Dept: CARDIOLOGY | Facility: CLINIC | Age: 49
End: 2024-07-09
Payer: MEDICAID

## 2024-07-09 VITALS
SYSTOLIC BLOOD PRESSURE: 99 MMHG | DIASTOLIC BLOOD PRESSURE: 68 MMHG | OXYGEN SATURATION: 98 % | BODY MASS INDEX: 43.36 KG/M2 | HEIGHT: 64 IN | HEART RATE: 60 BPM | WEIGHT: 254 LBS

## 2024-07-09 DIAGNOSIS — E66.01 MORBID OBESITY WITH BMI OF 40.0-44.9, ADULT: ICD-10-CM

## 2024-07-09 DIAGNOSIS — R53.83 FATIGUE, UNSPECIFIED TYPE: ICD-10-CM

## 2024-07-09 DIAGNOSIS — R06.02 SHORTNESS OF BREATH: Primary | ICD-10-CM

## 2024-07-09 DIAGNOSIS — R00.1 BRADYCARDIA: ICD-10-CM

## 2024-07-09 PROCEDURE — 99214 OFFICE O/P EST MOD 30 MIN: CPT | Performed by: INTERNAL MEDICINE

## 2024-07-09 PROCEDURE — 1159F MED LIST DOCD IN RCRD: CPT | Performed by: INTERNAL MEDICINE

## 2024-07-09 PROCEDURE — 93000 ELECTROCARDIOGRAM COMPLETE: CPT | Performed by: INTERNAL MEDICINE

## 2024-07-09 PROCEDURE — 1160F RVW MEDS BY RX/DR IN RCRD: CPT | Performed by: INTERNAL MEDICINE

## 2024-07-09 RX ORDER — CETIRIZINE HYDROCHLORIDE 10 MG/1
10 TABLET ORAL DAILY
COMMUNITY

## 2024-09-06 ENCOUNTER — HOSPITAL ENCOUNTER (OUTPATIENT)
Dept: CARDIOLOGY | Facility: HOSPITAL | Age: 49
Discharge: HOME | End: 2024-09-06
Payer: MEDICAID

## 2024-09-06 VITALS
DIASTOLIC BLOOD PRESSURE: 51 MMHG | HEIGHT: 64 IN | WEIGHT: 254 LBS | HEART RATE: 62 BPM | SYSTOLIC BLOOD PRESSURE: 97 MMHG | BODY MASS INDEX: 43.36 KG/M2

## 2024-09-06 DIAGNOSIS — E66.01 MORBID OBESITY WITH BMI OF 40.0-44.9, ADULT: ICD-10-CM

## 2024-09-06 DIAGNOSIS — R06.02 SHORTNESS OF BREATH: ICD-10-CM

## 2024-09-06 DIAGNOSIS — R53.83 FATIGUE, UNSPECIFIED TYPE: ICD-10-CM

## 2024-09-06 DIAGNOSIS — R00.1 BRADYCARDIA: ICD-10-CM

## 2024-09-06 LAB
BH CV ECHO LEFT VENTRICLE GLOBAL LONGITUDINAL STRAIN: -23.9 %
BH CV ECHO MEAS - AI P1/2T: 634.2 MSEC
BH CV ECHO MEAS - AO MAX PG: 12.5 MMHG
BH CV ECHO MEAS - AO MEAN PG: 5.7 MMHG
BH CV ECHO MEAS - AO ROOT DIAM: 2.5 CM
BH CV ECHO MEAS - AO V2 MAX: 176.8 CM/SEC
BH CV ECHO MEAS - AO V2 VTI: 41.2 CM
BH CV ECHO MEAS - AVA(I,D): 1.2 CM2
BH CV ECHO MEAS - EDV(CUBED): 88.3 ML
BH CV ECHO MEAS - EDV(MOD-SP4): 106.2 ML
BH CV ECHO MEAS - EF(MOD-BP): 70 %
BH CV ECHO MEAS - EF(MOD-SP4): 70 %
BH CV ECHO MEAS - ESV(CUBED): 33.1 ML
BH CV ECHO MEAS - ESV(MOD-SP4): 31.8 ML
BH CV ECHO MEAS - FS: 27.9 %
BH CV ECHO MEAS - IVS/LVPW: 1.03 CM
BH CV ECHO MEAS - IVSD: 0.91 CM
BH CV ECHO MEAS - LA DIMENSION: 4 CM
BH CV ECHO MEAS - LV MASS(C)D: 130 GRAMS
BH CV ECHO MEAS - LV MAX PG: 1.09 MMHG
BH CV ECHO MEAS - LV MEAN PG: 0.7 MMHG
BH CV ECHO MEAS - LV V1 MAX: 52.1 CM/SEC
BH CV ECHO MEAS - LV V1 VTI: 15.4 CM
BH CV ECHO MEAS - LVIDD: 4.5 CM
BH CV ECHO MEAS - LVIDS: 3.2 CM
BH CV ECHO MEAS - LVOT AREA: 3.2 CM2
BH CV ECHO MEAS - LVOT DIAM: 2.02 CM
BH CV ECHO MEAS - LVPWD: 0.89 CM
BH CV ECHO MEAS - MV A MAX VEL: 49.2 CM/SEC
BH CV ECHO MEAS - MV DEC SLOPE: 297.6 CM/SEC2
BH CV ECHO MEAS - MV DEC TIME: 0.25 SEC
BH CV ECHO MEAS - MV E MAX VEL: 74.7 CM/SEC
BH CV ECHO MEAS - MV E/A: 1.52
BH CV ECHO MEAS - MV MAX PG: 2.5 MMHG
BH CV ECHO MEAS - MV MEAN PG: 0.89 MMHG
BH CV ECHO MEAS - MV V2 VTI: 36.8 CM
BH CV ECHO MEAS - MVA(VTI): 1.35 CM2
BH CV ECHO MEAS - PA V2 MAX: 104.4 CM/SEC
BH CV ECHO MEAS - PI END-D VEL: 73 CM/SEC
BH CV ECHO MEAS - RAP SYSTOLE: 3 MMHG
BH CV ECHO MEAS - RV MAX PG: 0.86 MMHG
BH CV ECHO MEAS - RV V1 MAX: 46.5 CM/SEC
BH CV ECHO MEAS - RV V1 VTI: 13.3 CM
BH CV ECHO MEAS - RVSP: 21.9 MMHG
BH CV ECHO MEAS - SV(LVOT): 49.6 ML
BH CV ECHO MEAS - SV(MOD-SP4): 74.3 ML
BH CV ECHO MEAS - TR MAX PG: 18.9 MMHG
BH CV ECHO MEAS - TR MAX VEL: 216.5 CM/SEC

## 2024-09-06 PROCEDURE — 93356 MYOCRD STRAIN IMG SPCKL TRCK: CPT | Performed by: INTERNAL MEDICINE

## 2024-09-06 PROCEDURE — 93356 MYOCRD STRAIN IMG SPCKL TRCK: CPT

## 2024-09-06 PROCEDURE — 93306 TTE W/DOPPLER COMPLETE: CPT | Performed by: INTERNAL MEDICINE

## 2024-09-06 PROCEDURE — 93306 TTE W/DOPPLER COMPLETE: CPT

## 2024-09-10 ENCOUNTER — LAB (OUTPATIENT)
Dept: FAMILY MEDICINE CLINIC | Facility: CLINIC | Age: 49
End: 2024-09-10
Payer: MEDICAID

## 2024-09-10 ENCOUNTER — TELEPHONE (OUTPATIENT)
Dept: FAMILY MEDICINE CLINIC | Facility: CLINIC | Age: 49
End: 2024-09-10
Payer: MEDICAID

## 2024-09-10 ENCOUNTER — OFFICE VISIT (OUTPATIENT)
Dept: FAMILY MEDICINE CLINIC | Facility: CLINIC | Age: 49
End: 2024-09-10
Payer: MEDICAID

## 2024-09-10 VITALS
DIASTOLIC BLOOD PRESSURE: 79 MMHG | RESPIRATION RATE: 16 BRPM | HEART RATE: 53 BPM | WEIGHT: 257.5 LBS | TEMPERATURE: 97.2 F | HEIGHT: 64 IN | BODY MASS INDEX: 43.96 KG/M2 | SYSTOLIC BLOOD PRESSURE: 110 MMHG | OXYGEN SATURATION: 98 %

## 2024-09-10 DIAGNOSIS — E55.9 VITAMIN D DEFICIENCY: ICD-10-CM

## 2024-09-10 DIAGNOSIS — E03.9 HYPOTHYROIDISM, UNSPECIFIED TYPE: Primary | ICD-10-CM

## 2024-09-10 DIAGNOSIS — R53.83 FATIGUE, UNSPECIFIED TYPE: ICD-10-CM

## 2024-09-10 LAB
25(OH)D3 SERPL-MCNC: 37.6 NG/ML (ref 30–100)
IRON 24H UR-MRATE: 43 MCG/DL (ref 37–145)
TSH SERPL DL<=0.05 MIU/L-ACNC: 1.63 UIU/ML (ref 0.27–4.2)
VIT B12 BLD-MCNC: 676 PG/ML (ref 211–946)

## 2024-09-10 PROCEDURE — 83540 ASSAY OF IRON: CPT | Performed by: FAMILY MEDICINE

## 2024-09-10 PROCEDURE — 84443 ASSAY THYROID STIM HORMONE: CPT | Performed by: FAMILY MEDICINE

## 2024-09-10 PROCEDURE — 1160F RVW MEDS BY RX/DR IN RCRD: CPT | Performed by: FAMILY MEDICINE

## 2024-09-10 PROCEDURE — 99214 OFFICE O/P EST MOD 30 MIN: CPT | Performed by: FAMILY MEDICINE

## 2024-09-10 PROCEDURE — 36415 COLL VENOUS BLD VENIPUNCTURE: CPT | Performed by: FAMILY MEDICINE

## 2024-09-10 PROCEDURE — 1159F MED LIST DOCD IN RCRD: CPT | Performed by: FAMILY MEDICINE

## 2024-09-10 PROCEDURE — 82607 VITAMIN B-12: CPT | Performed by: FAMILY MEDICINE

## 2024-09-10 PROCEDURE — 82306 VITAMIN D 25 HYDROXY: CPT | Performed by: FAMILY MEDICINE

## 2024-09-10 NOTE — TELEPHONE ENCOUNTER
Sleep Medicine referrals normally take awhile to hear back on. I suggest always giving patient scheduling numbers if they ask about their referrals. Sleep Med: [phone].

## 2024-09-10 NOTE — PROGRESS NOTES
Subjective   Catherine Nicholas is a 49 y.o. female.     History of Present Illness   The patient present with complaint of chronic fatigue and follow up on hypothyroidism.  She feels no motivation and lack of energy.   She denies chest pain, palpitations, shortness of breath, trouble swallowing, anxiety, abdominal pain and constipation.  Since the last visit, the patient states  she is taking medication as directed.       The following portions of the patient's history were reviewed and updated as appropriate: past medical history, past social history, past surgical history and problem list.    Review of Systems   Constitutional:  Positive for fatigue. Negative for activity change.   Respiratory:  Negative for shortness of breath.    Cardiovascular:  Negative for chest pain and palpitations.   Gastrointestinal:  Negative for abdominal pain and indigestion.   Endocrine: Negative for cold intolerance.   Neurological:  Negative for headache.   Psychiatric/Behavioral:  The patient is not nervous/anxious.        Objective   Physical Exam  Vitals reviewed.   Constitutional:       Appearance: She is well-developed.   Neck:      Thyroid: No thyromegaly.   Cardiovascular:      Heart sounds: Normal heart sounds.   Pulmonary:      Effort: Pulmonary effort is normal.      Breath sounds: Normal breath sounds. No wheezing.   Abdominal:      Tenderness: There is no abdominal tenderness.   Musculoskeletal:      Cervical back: Normal range of motion and neck supple. No tenderness.   Neurological:      Mental Status: She is alert and oriented to person, place, and time.   Psychiatric:         Mood and Affect: Mood normal.         Vitals:    09/10/24 1312   BP: 110/79   Pulse: 53   Resp: 16   Temp: 97.2 °F (36.2 °C)   SpO2: 98%     Current Outpatient Medications on File Prior to Visit   Medication Sig Dispense Refill    albuterol sulfate  (90 Base) MCG/ACT inhaler Inhale 2 puffs Every 4 (Four) Hours As Needed for Shortness of Air.  18 g 3    levothyroxine (SYNTHROID, LEVOTHROID) 137 MCG tablet Take 1 tablet by mouth Daily. 90 tablet 3    PATIENT SUPPLIED ALLERGY INJECTION Inject  under the skin into the appropriate area as directed 1 (One) Time.      vitamin D (ERGOCALCIFEROL) 1.25 MG (51280 UT) capsule capsule Take 1 capsule by mouth 1 (One) Time Per Week. 5 capsule 4     No current facility-administered medications on file prior to visit.         Assessment & Plan   Problems Addressed this Visit       Hypothyroidism - Primary     Continue levothyroxine we will check TSH level.         Relevant Orders    TSH (Completed)    Iron level (Completed)    Fatigue    Relevant Orders    TSH (Completed)    Vitamin B12 (Completed)    Iron level (Completed)    Vitamin D deficiency    Relevant Orders    Vitamin D 25 hydroxy (Completed)     Diagnoses         Codes Comments    Hypothyroidism, unspecified type    -  Primary ICD-10-CM: E03.9  ICD-9-CM: 244.9     Fatigue, unspecified type     ICD-10-CM: R53.83  ICD-9-CM: 780.79     Vitamin D deficiency     ICD-10-CM: E55.9  ICD-9-CM: 268.9

## 2024-09-12 PROBLEM — E55.9 VITAMIN D DEFICIENCY: Status: ACTIVE | Noted: 2024-09-12

## 2024-09-24 ENCOUNTER — OFFICE VISIT (OUTPATIENT)
Dept: SLEEP MEDICINE | Facility: CLINIC | Age: 49
End: 2024-09-24
Payer: MEDICAID

## 2024-09-24 VITALS
WEIGHT: 256.4 LBS | SYSTOLIC BLOOD PRESSURE: 118 MMHG | HEIGHT: 64 IN | OXYGEN SATURATION: 97 % | BODY MASS INDEX: 43.77 KG/M2 | DIASTOLIC BLOOD PRESSURE: 66 MMHG | HEART RATE: 56 BPM

## 2024-09-24 DIAGNOSIS — G47.19 EXCESSIVE DAYTIME SLEEPINESS: ICD-10-CM

## 2024-09-24 DIAGNOSIS — G47.30 OBSERVED SLEEP APNEA: Primary | ICD-10-CM

## 2024-09-24 DIAGNOSIS — R51.9 MORNING HEADACHE: ICD-10-CM

## 2024-09-24 DIAGNOSIS — R53.83 FATIGUE, UNSPECIFIED TYPE: ICD-10-CM

## 2024-09-24 DIAGNOSIS — R06.83 SNORING: ICD-10-CM

## 2024-09-24 DIAGNOSIS — G47.8 NON-RESTORATIVE SLEEP: ICD-10-CM

## 2024-09-24 DIAGNOSIS — E66.01 CLASS 3 SEVERE OBESITY DUE TO EXCESS CALORIES WITHOUT SERIOUS COMORBIDITY WITH BODY MASS INDEX (BMI) OF 40.0 TO 44.9 IN ADULT: ICD-10-CM

## 2024-09-24 PROBLEM — E66.813 CLASS 3 SEVERE OBESITY DUE TO EXCESS CALORIES WITHOUT SERIOUS COMORBIDITY WITH BODY MASS INDEX (BMI) OF 40.0 TO 44.9 IN ADULT: Status: ACTIVE | Noted: 2024-09-24

## 2024-09-24 PROCEDURE — G0463 HOSPITAL OUTPT CLINIC VISIT: HCPCS

## 2024-09-24 PROCEDURE — 1160F RVW MEDS BY RX/DR IN RCRD: CPT | Performed by: INTERNAL MEDICINE

## 2024-09-24 PROCEDURE — 99204 OFFICE O/P NEW MOD 45 MIN: CPT | Performed by: INTERNAL MEDICINE

## 2024-09-24 PROCEDURE — 1159F MED LIST DOCD IN RCRD: CPT | Performed by: INTERNAL MEDICINE

## 2024-10-01 ENCOUNTER — HOSPITAL ENCOUNTER (OUTPATIENT)
Dept: SLEEP MEDICINE | Facility: HOSPITAL | Age: 49
End: 2024-10-01
Payer: MEDICAID

## 2024-10-01 DIAGNOSIS — G47.8 NON-RESTORATIVE SLEEP: ICD-10-CM

## 2024-10-01 DIAGNOSIS — R51.9 MORNING HEADACHE: ICD-10-CM

## 2024-10-01 DIAGNOSIS — E66.01 CLASS 3 SEVERE OBESITY DUE TO EXCESS CALORIES WITHOUT SERIOUS COMORBIDITY WITH BODY MASS INDEX (BMI) OF 40.0 TO 44.9 IN ADULT: ICD-10-CM

## 2024-10-01 DIAGNOSIS — G47.19 EXCESSIVE DAYTIME SLEEPINESS: ICD-10-CM

## 2024-10-01 DIAGNOSIS — E66.813 CLASS 3 SEVERE OBESITY DUE TO EXCESS CALORIES WITHOUT SERIOUS COMORBIDITY WITH BODY MASS INDEX (BMI) OF 40.0 TO 44.9 IN ADULT: ICD-10-CM

## 2024-10-01 DIAGNOSIS — R06.83 SNORING: ICD-10-CM

## 2024-10-01 DIAGNOSIS — G47.30 OBSERVED SLEEP APNEA: ICD-10-CM

## 2024-10-01 DIAGNOSIS — R53.83 FATIGUE, UNSPECIFIED TYPE: ICD-10-CM

## 2024-10-01 PROCEDURE — 95806 SLEEP STUDY UNATT&RESP EFFT: CPT

## 2024-10-08 DIAGNOSIS — G47.33 OSA (OBSTRUCTIVE SLEEP APNEA): Primary | ICD-10-CM

## 2024-10-08 DIAGNOSIS — R06.83 SNORING: ICD-10-CM

## 2024-11-27 RX ORDER — ERGOCALCIFEROL 1.25 MG/1
50000 CAPSULE, LIQUID FILLED ORAL WEEKLY
Qty: 5 CAPSULE | Refills: 0 | Status: SHIPPED | OUTPATIENT
Start: 2024-11-27

## 2024-12-16 ENCOUNTER — OFFICE VISIT (OUTPATIENT)
Dept: FAMILY MEDICINE CLINIC | Facility: CLINIC | Age: 49
End: 2024-12-16
Payer: MEDICAID

## 2024-12-16 VITALS
TEMPERATURE: 97.5 F | OXYGEN SATURATION: 98 % | SYSTOLIC BLOOD PRESSURE: 119 MMHG | HEART RATE: 54 BPM | WEIGHT: 241.1 LBS | DIASTOLIC BLOOD PRESSURE: 66 MMHG | RESPIRATION RATE: 16 BRPM | HEIGHT: 64 IN | BODY MASS INDEX: 41.16 KG/M2

## 2024-12-16 DIAGNOSIS — J02.9 ACUTE PHARYNGITIS, UNSPECIFIED ETIOLOGY: ICD-10-CM

## 2024-12-16 DIAGNOSIS — J02.9 SORE THROAT: Primary | ICD-10-CM

## 2024-12-16 PROCEDURE — 99213 OFFICE O/P EST LOW 20 MIN: CPT | Performed by: FAMILY MEDICINE

## 2024-12-16 PROCEDURE — 1159F MED LIST DOCD IN RCRD: CPT | Performed by: FAMILY MEDICINE

## 2024-12-16 PROCEDURE — 1160F RVW MEDS BY RX/DR IN RCRD: CPT | Performed by: FAMILY MEDICINE

## 2024-12-16 RX ORDER — AMOXICILLIN 500 MG/1
500 TABLET, FILM COATED ORAL 2 TIMES DAILY
Qty: 20 TABLET | Refills: 0 | Status: SHIPPED | OUTPATIENT
Start: 2024-12-16 | End: 2024-12-26

## 2024-12-16 NOTE — ASSESSMENT & PLAN NOTE
Rapid strep test negative.  Discussed symptom management and encourage fluids. The patient is leaving for vacation this week and would like to have antibiotic available in case if her symptoms get worse prescription sent amoxicillin take as directed.

## 2024-12-16 NOTE — PROGRESS NOTES
Subjective   Catherine Nicholas is a 49 y.o. female.     History of Present Illness     History of Present Illness  The patient is a 49-year-old female who presents with a complaint of sore throat. She reports the onset of her sore throat was 4 days ago.  She also noted coughing episode when she used her CPAP machine,  however, she does not report any daytime cough. She describes significant throat pain upon swallowing. She also reports a general feeling of weakness and lack of energy over the past two days. She has undergone two home COVID-19 tests, both of which returned negative results. Additionally, she reports ear discomfort, characterized by pressure, itching and a sensation of fluid accumulation. She does not report any fever, wheezing, or shortness of breath.       The following portions of the patient's history were reviewed and updated as appropriate: past medical history, past social history, past surgical history and problem list.    Review of Systems   Constitutional:  Negative for fever.   HENT:  Positive for congestion, sore throat and trouble swallowing. Negative for ear discharge and ear pain.         Left ear pressure   Respiratory:  Negative for cough, shortness of breath and wheezing.          Objective   Physical Exam  Vitals reviewed.   Constitutional:       General: She is not in acute distress.  HENT:      Right Ear: Tympanic membrane, ear canal and external ear normal. There is no impacted cerumen.      Left Ear: Tympanic membrane, ear canal and external ear normal. There is no impacted cerumen.      Nose: No rhinorrhea.      Mouth/Throat:      Mouth: Mucous membranes are moist.      Pharynx: Posterior oropharyngeal erythema present.   Eyes:      Conjunctiva/sclera: Conjunctivae normal.   Pulmonary:      Effort: Pulmonary effort is normal.      Breath sounds: Normal breath sounds. No wheezing.   Neurological:      Mental Status: She is alert.         Vitals:    12/16/24 1437   BP: 119/66   Pulse:  54   Resp: 16   Temp: 97.5 °F (36.4 °C)   SpO2: 98%     Current Outpatient Medications on File Prior to Visit   Medication Sig Dispense Refill    albuterol sulfate  (90 Base) MCG/ACT inhaler Inhale 2 puffs Every 4 (Four) Hours As Needed for Shortness of Air. 18 g 3    levothyroxine (SYNTHROID, LEVOTHROID) 137 MCG tablet Take 1 tablet by mouth Daily. 90 tablet 3    PATIENT SUPPLIED ALLERGY INJECTION Inject  under the skin into the appropriate area as directed 1 (One) Time.      vitamin D (ERGOCALCIFEROL) 1.25 MG (27462 UT) capsule capsule Take 1 capsule by mouth once a week 5 capsule 0     No current facility-administered medications on file prior to visit.         Assessment & Plan   Problems Addressed this Visit       Sore throat - Primary    Acute pharyngitis     Rapid strep test negative.  Discussed symptom management and encourage fluids. The patient is leaving for vacation this week and would like to have antibiotic available in case if her symptoms get worse prescription sent amoxicillin take as directed.         Relevant Medications    amoxicillin (AMOXIL) 500 MG tablet     Diagnoses         Codes Comments    Sore throat    -  Primary ICD-10-CM: J02.9  ICD-9-CM: 462     Acute pharyngitis, unspecified etiology     ICD-10-CM: J02.9  ICD-9-CM: 462                  Patient or patient representative verbalized consent for the use of Ambient Listening during the visit with  Gilberto Manzanares MD for chart documentation. 12/16/2024  14:42 EST

## 2024-12-31 RX ORDER — ERGOCALCIFEROL 1.25 MG/1
50000 CAPSULE, LIQUID FILLED ORAL WEEKLY
Qty: 5 CAPSULE | Refills: 0 | Status: SHIPPED | OUTPATIENT
Start: 2024-12-31

## 2025-02-07 RX ORDER — ERGOCALCIFEROL 1.25 MG/1
50000 CAPSULE, LIQUID FILLED ORAL WEEKLY
Qty: 5 CAPSULE | Refills: 0 | Status: SHIPPED | OUTPATIENT
Start: 2025-02-07

## 2025-02-26 ENCOUNTER — TELEPHONE (OUTPATIENT)
Dept: FAMILY MEDICINE CLINIC | Facility: CLINIC | Age: 50
End: 2025-02-26

## 2025-02-26 ENCOUNTER — APPOINTMENT (OUTPATIENT)
Dept: GENERAL RADIOLOGY | Facility: HOSPITAL | Age: 50
End: 2025-02-26
Payer: MEDICAID

## 2025-02-26 ENCOUNTER — HOSPITAL ENCOUNTER (EMERGENCY)
Facility: HOSPITAL | Age: 50
Discharge: HOME | End: 2025-02-26
Attending: EMERGENCY MEDICINE | Admitting: EMERGENCY MEDICINE
Payer: MEDICAID

## 2025-02-26 VITALS
HEIGHT: 64 IN | BODY MASS INDEX: 41.03 KG/M2 | DIASTOLIC BLOOD PRESSURE: 70 MMHG | RESPIRATION RATE: 18 BRPM | SYSTOLIC BLOOD PRESSURE: 130 MMHG | OXYGEN SATURATION: 98 % | TEMPERATURE: 98.3 F | WEIGHT: 240.3 LBS | HEART RATE: 55 BPM

## 2025-02-26 DIAGNOSIS — J11.1 INFLUENZA: Primary | ICD-10-CM

## 2025-02-26 LAB
FLUAV RNA RESP QL NAA+PROBE: DETECTED
FLUBV RNA RESP QL NAA+PROBE: NOT DETECTED
RSV RNA RESP QL NAA+PROBE: NOT DETECTED
SARS-COV-2 RNA RESP QL NAA+PROBE: NOT DETECTED

## 2025-02-26 PROCEDURE — 87637 SARSCOV2&INF A&B&RSV AMP PRB: CPT

## 2025-02-26 PROCEDURE — 71045 X-RAY EXAM CHEST 1 VIEW: CPT

## 2025-02-26 PROCEDURE — 99283 EMERGENCY DEPT VISIT LOW MDM: CPT

## 2025-02-26 RX ORDER — BROMPHENIRAMINE MALEATE, PSEUDOEPHEDRINE HYDROCHLORIDE, AND DEXTROMETHORPHAN HYDROBROMIDE 2; 30; 10 MG/5ML; MG/5ML; MG/5ML
5 SYRUP ORAL 4 TIMES DAILY PRN
Qty: 118 ML | Refills: 0 | Status: SHIPPED | OUTPATIENT
Start: 2025-02-26

## 2025-02-26 NOTE — TELEPHONE ENCOUNTER
Caller: Catherine Nicholas    Relationship to patient: Self    Best call back number: [phone]     Chief complaint: FATIGUE, COUGH, LOW FEVER, SHORT OF BREATH AT REST, FELT LIKE SHE WOULD FAINT AND WAS LIGHTHEADED YESTERDAY 02-    Patient directed to call 911 or go to their nearest emergency room.     Patient verbalized understanding: [x] Yes  [] No  If no, why?    Additional notes:PATIENT STATED SHE WILL GOT TO [facility] ER IN [place].    PLEASE CALL IF NEEDED TO DISCUSS.

## 2025-02-26 NOTE — ED PROVIDER NOTES
Subjective   History of Present Illness  49-year-old female states she has had cough congestion and loose stool over the last 3 to 4 days.  She states she had a low-grade fever.  She denies any known ill contact.  States been coughing up some green phlegm.  She had negative COVID screen.  She denies vomiting  Review of Systems    Past Medical History:   Diagnosis Date    Bradycardia     Frozen shoulder December 2023    Not sure if its a frozen shoulder but it hurts to more it or use it    Hypothyroidism     Palpitation        No Known Allergies    Past Surgical History:   Procedure Laterality Date    BREAST BIOPSY         Family History   Problem Relation Age of Onset    No Known Problems Mother     No Known Problems Father     Sleep apnea Neg Hx        Social History     Socioeconomic History    Marital status:    Tobacco Use    Smoking status: Former     Current packs/day: 1.00     Average packs/day: 1 pack/day for 15.0 years (15.0 ttl pk-yrs)     Types: Cigarettes    Smokeless tobacco: Never    Tobacco comments:     I smoked for almost 20 years quit in 2009   Vaping Use    Vaping status: Never Used   Substance and Sexual Activity    Alcohol use: No    Drug use: Never    Sexual activity: Yes     Partners: Female, Male     Birth control/protection: Tubal ligation     Prior to Admission medications    Medication Sig Start Date End Date Taking? Authorizing Provider   albuterol sulfate  (90 Base) MCG/ACT inhaler Inhale 2 puffs Every 4 (Four) Hours As Needed for Shortness of Air. 6/28/24   Gilberto Manzanares MD   levothyroxine (SYNTHROID, LEVOTHROID) 137 MCG tablet Take 1 tablet by mouth Daily. 7/3/24   Gilberto Manzanares MD   PATIENT SUPPLIED ALLERGY INJECTION Inject  under the skin into the appropriate area as directed 1 (One) Time.    Provider, MD Gage   vitamin D (ERGOCALCIFEROL) 1.25 MG (10902 UT) capsule capsule Take 1 capsule by mouth once a week 2/7/25   Gilberto Manzanares MD     /69 (BP  "Location: Left arm, Patient Position: Sitting)   Pulse 57   Temp 98.3 °F (36.8 °C) (Oral)   Resp 18   Ht 162.6 cm (64\")   Wt 109 kg (240 lb 4.8 oz)   LMP 02/26/2025   SpO2 98%   BMI 41.25 kg/m²         Objective   Physical Exam  General: Well-developed well-appearing, no acute distress, alert and appropriate  Eyes: Conjunctiva normal, sclera nonicteric  HEENT: Mucous membranes moist, no mucosal swelling  Neck: Supple, no nuchal rigidity, no JVD  Respirations: Respirations nonlabored, equal breath sounds bilaterally, clear lungs  Heart regular rate and rhythm, no murmurs rubs or gallops,   Abdomen soft nontender nondistended,   Extremities no clubbing cyanosis or edema, calves are symmetric and nontender  Neuro cranial nerves grossly intact, no focal limb deficits  Psych oriented, pleasant affect  Skin no rash, brisk cap refill  Procedures           ED Course      Results for orders placed or performed during the hospital encounter of 02/26/25   COVID-19, FLU A/B, RSV PCR 1 HR TAT - Swab, Nasopharynx    Collection Time: 02/26/25  5:07 PM    Specimen: Nasopharynx; Swab   Result Value Ref Range    COVID19 Not Detected Not Detected - Ref. Range    Influenza A PCR Detected (A) Not Detected    Influenza B PCR Not Detected Not Detected    RSV, PCR Not Detected Not Detected     XR Chest 1 View    Result Date: 2/26/2025  Impression: Mild bilateral lower lobe interstitial thickening may be on the basis of atypical pneumonia. Electronically Signed: Geraldo Paiz MD  2/26/2025 7:40 PM EST  Workstation ID: [id number]                                                    Medical Decision Making  Patient is in no respiratory distress.  Oxygen levels normal.  She is well-appearing without signs of sepsis.  She was positive for influenza.  She is outside of a time window for therapeutic benefit from Tamiflu.  She was prescribed Bromfed-DM.  She is advised of findings she is discharged in a condition was given warning signs for " return.  Patient is agreeable to plan.    Problems Addressed:  Influenza: complicated acute illness or injury    Amount and/or Complexity of Data Reviewed  Labs: ordered. Decision-making details documented in ED Course.     Details: Respiratory screen positive for influenza  Radiology: ordered and independent interpretation performed.     Details: My independent interpretation of chest x-ray image no focal pneumonia, some nonspecific interstitial thickening in the bases bilaterally atelectasis versus viral pneumonia    Risk  Prescription drug management.        Final diagnoses:   Influenza       ED Disposition  ED Disposition       ED Disposition   Discharge    Condition   Stable    Comment   --               Gilberto Manzanares MD  [address] [facility]  [address]  [place] [address]  [phone]    In 1 week  As needed         Medication List        New Prescriptions      brompheniramine-pseudoephedrine-DM 30-2-10 MG/5ML syrup  Take 5 mL by mouth 4 (Four) Times a Day As Needed for Congestion or Cough.               Where to Get Your Medications        These medications were sent to [facility]  [place] [place] [address] - [phone]  - [phone] FX  [address]  [place] [address]      Phone: [phone]   brompheniramine-pseudoephedrine-DM 30-2-10 MG/5ML syrup            Johnathon Kelley MD  02/26/25 1958

## 2025-02-27 NOTE — DISCHARGE INSTRUCTIONS
Rest, drink plenty of fluids, frequent handwashing, avoid contact with others until fever free for 24 hours and improving respiratory symptoms.  Return for increased shortness of breath, persistent vomiting or any other concern

## 2025-04-11 ENCOUNTER — OFFICE VISIT (OUTPATIENT)
Dept: FAMILY MEDICINE CLINIC | Facility: CLINIC | Age: 50
End: 2025-04-11
Payer: MEDICAID

## 2025-04-11 ENCOUNTER — LAB (OUTPATIENT)
Dept: FAMILY MEDICINE CLINIC | Facility: CLINIC | Age: 50
End: 2025-04-11
Payer: MEDICAID

## 2025-04-11 ENCOUNTER — TELEPHONE (OUTPATIENT)
Dept: FAMILY MEDICINE CLINIC | Facility: CLINIC | Age: 50
End: 2025-04-11

## 2025-04-11 VITALS
HEIGHT: 64 IN | TEMPERATURE: 97 F | DIASTOLIC BLOOD PRESSURE: 70 MMHG | RESPIRATION RATE: 16 BRPM | HEART RATE: 53 BPM | OXYGEN SATURATION: 99 % | SYSTOLIC BLOOD PRESSURE: 120 MMHG | BODY MASS INDEX: 42.68 KG/M2 | WEIGHT: 250 LBS

## 2025-04-11 DIAGNOSIS — Z00.00 ENCOUNTER FOR WELL ADULT EXAM WITHOUT ABNORMAL FINDINGS: Primary | ICD-10-CM

## 2025-04-11 DIAGNOSIS — E55.9 VITAMIN D DEFICIENCY: ICD-10-CM

## 2025-04-11 DIAGNOSIS — Z12.31 SCREENING MAMMOGRAM FOR BREAST CANCER: ICD-10-CM

## 2025-04-11 LAB
25(OH)D3 SERPL-MCNC: 29.7 NG/ML (ref 30–100)
ALBUMIN SERPL-MCNC: 4.1 G/DL (ref 3.5–5.2)
ALBUMIN/GLOB SERPL: 1.3 G/DL
ALP SERPL-CCNC: 139 U/L (ref 39–117)
ALT SERPL W P-5'-P-CCNC: 11 U/L (ref 1–33)
ANION GAP SERPL CALCULATED.3IONS-SCNC: 11 MMOL/L (ref 5–15)
AST SERPL-CCNC: 15 U/L (ref 1–32)
BILIRUB SERPL-MCNC: 0.3 MG/DL (ref 0–1.2)
BUN SERPL-MCNC: 14 MG/DL (ref 6–20)
BUN/CREAT SERPL: 20.6 (ref 7–25)
CALCIUM SPEC-SCNC: 9.2 MG/DL (ref 8.6–10.5)
CHLORIDE SERPL-SCNC: 102 MMOL/L (ref 98–107)
CHOLEST SERPL-MCNC: 178 MG/DL (ref 0–200)
CO2 SERPL-SCNC: 26 MMOL/L (ref 22–29)
CREAT SERPL-MCNC: 0.68 MG/DL (ref 0.57–1)
EGFRCR SERPLBLD CKD-EPI 2021: 106.3 ML/MIN/1.73
GLOBULIN UR ELPH-MCNC: 3.1 GM/DL
GLUCOSE SERPL-MCNC: 89 MG/DL (ref 65–99)
HDLC SERPL-MCNC: 56 MG/DL (ref 40–60)
LDLC SERPL CALC-MCNC: 103 MG/DL (ref 0–100)
LDLC/HDLC SERPL: 1.8 {RATIO}
POTASSIUM SERPL-SCNC: 4.1 MMOL/L (ref 3.5–5.2)
PROT SERPL-MCNC: 7.2 G/DL (ref 6–8.5)
SODIUM SERPL-SCNC: 139 MMOL/L (ref 136–145)
TRIGL SERPL-MCNC: 107 MG/DL (ref 0–150)
TSH SERPL DL<=0.05 MIU/L-ACNC: 1.31 UIU/ML (ref 0.27–4.2)
VLDLC SERPL-MCNC: 19 MG/DL (ref 5–40)

## 2025-04-11 PROCEDURE — 84443 ASSAY THYROID STIM HORMONE: CPT | Performed by: FAMILY MEDICINE

## 2025-04-11 PROCEDURE — 80061 LIPID PANEL: CPT | Performed by: FAMILY MEDICINE

## 2025-04-11 PROCEDURE — 82306 VITAMIN D 25 HYDROXY: CPT | Performed by: FAMILY MEDICINE

## 2025-04-11 PROCEDURE — 36415 COLL VENOUS BLD VENIPUNCTURE: CPT | Performed by: FAMILY MEDICINE

## 2025-04-11 PROCEDURE — 80053 COMPREHEN METABOLIC PANEL: CPT | Performed by: FAMILY MEDICINE

## 2025-04-11 NOTE — TELEPHONE ENCOUNTER
Catherine Cristopher daughter age 32 has moved from [place],  She would like you to take her as a new patient.

## 2025-04-11 NOTE — PROGRESS NOTES
Subjective   Catherine Nicholas is a 49 y.o. female.     History of Present Illness     History of Present Illness  The patient is a 49-year-old female who presents for an annual physical exam. She reports no respiratory symptoms such as shortness of breath, cough, or wheezing. Additionally, she is not experiencing chest pain or palpitations. Gastrointestinal symptoms including heartburn, pain, nausea, vomiting, and diarrhea are also absent. She has been adhering to her prescribed thyroid medication regimen. She is currently fasting for lab wrok. She has been compliant with her vitamin D supplementation, taking it once weekly. She reports no breast-related symptoms such as pain, lumps, or mass. She acknowledges that she was adhering to dietary modifications and has last 16 pounds prior to December 2024 but has since deviated from this regimen.      The following portions of the patient's history were reviewed and updated as appropriate: past medical history, past social history, past surgical history and problem list.    Review of Systems   Constitutional:  Negative for appetite change.   Respiratory:  Negative for cough, shortness of breath and wheezing.    Cardiovascular:  Negative for chest pain and palpitations.   Gastrointestinal:  Negative for abdominal pain, nausea, vomiting and indigestion.   Endocrine: Negative for cold intolerance.   Genitourinary:  Negative for dysuria.   Neurological:  Negative for headache.   Psychiatric/Behavioral:  Negative for sleep disturbance and depressed mood. The patient is not nervous/anxious.          Objective   Physical Exam  Vitals reviewed.   Constitutional:       Appearance: She is well-developed.   Neck:      Thyroid: No thyromegaly.   Cardiovascular:      Heart sounds: Normal heart sounds.   Pulmonary:      Effort: Pulmonary effort is normal.      Breath sounds: Normal breath sounds. No wheezing.   Abdominal:      Tenderness: There is no abdominal tenderness.    Musculoskeletal:      Cervical back: Neck supple. No tenderness.   Neurological:      Mental Status: She is alert and oriented to person, place, and time.   Psychiatric:         Mood and Affect: Mood normal.         Vitals:    04/11/25 1026   BP: 120/70   Pulse: 53   Resp: 16   Temp: 97 °F (36.1 °C)   SpO2: 99%     Current Outpatient Medications on File Prior to Visit   Medication Sig Dispense Refill    albuterol sulfate  (90 Base) MCG/ACT inhaler Inhale 2 puffs Every 4 (Four) Hours As Needed for Shortness of Air. 18 g 3    brompheniramine-pseudoephedrine-DM 30-2-10 MG/5ML syrup Take 5 mL by mouth 4 (Four) Times a Day As Needed for Congestion or Cough. 118 mL 0    levothyroxine (SYNTHROID, LEVOTHROID) 137 MCG tablet Take 1 tablet by mouth Daily. 90 tablet 3    PATIENT SUPPLIED ALLERGY INJECTION Inject  under the skin into the appropriate area as directed 1 (One) Time.      vitamin D (ERGOCALCIFEROL) 1.25 MG (81262 UT) capsule capsule Take 1 capsule by mouth once a week 5 capsule 0     No current facility-administered medications on file prior to visit.         Assessment & Plan   Problems Addressed this Visit       Encounter for well adult exam without abnormal findings - Primary    Relevant Orders    Comprehensive metabolic panel    TSH    Lipid panel    Screening mammogram for breast cancer    Relevant Orders    Mammo Screening Digital Tomosynthesis Bilateral With CAD    Vitamin D deficiency    Relevant Orders    Vitamin D 25 hydroxy     Diagnoses         Codes Comments      Encounter for well adult exam without abnormal findings    -  Primary ICD-10-CM: Z00.00  ICD-9-CM: V70.0       Screening mammogram for breast cancer     ICD-10-CM: Z12.31  ICD-9-CM: V76.12       Vitamin D deficiency     ICD-10-CM: E55.9  ICD-9-CM: 268.9             Assessment & Plan  1. Annual physical examination.  Discussed healthy diet and  importance of regular exercise.  She has experienced a weight fluctuation, initially losing  16 pounds but subsequently regaining 10 pounds. Her weight decreased from 256 to 240 pounds in December 2024, but has since increased to 250 pounds as of today. Advised low  cholesterol diet, sufficient intake of fresh fruits and vegetables.  A mammogram order will be provided during this visit. Blood work will be conducted today to assess thyroid function and cholesterol levels, and  vitamin D level.  She is up-to-date with colon cancer screening.             Patient or patient representative verbalized consent for the use of Ambient Listening during the visit with  Gilberto Manzanares MD for chart documentation. 4/11/2025  10:35 EDT

## 2025-04-13 ENCOUNTER — RESULTS FOLLOW-UP (OUTPATIENT)
Dept: FAMILY MEDICINE CLINIC | Facility: CLINIC | Age: 50
End: 2025-04-13
Payer: MEDICAID

## 2025-04-14 RX ORDER — ERGOCALCIFEROL 1.25 MG/1
50000 CAPSULE, LIQUID FILLED ORAL WEEKLY
Qty: 5 CAPSULE | Refills: 0 | Status: SHIPPED | OUTPATIENT
Start: 2025-04-14

## 2025-04-18 ENCOUNTER — HOSPITAL ENCOUNTER (OUTPATIENT)
Dept: MAMMOGRAPHY | Facility: HOSPITAL | Age: 50
Discharge: HOME | End: 2025-04-18
Admitting: FAMILY MEDICINE
Payer: MEDICAID

## 2025-04-18 LAB
NCCN CRITERIA FLAG: NORMAL
TYRER CUZICK SCORE: 6.7

## 2025-04-18 PROCEDURE — 77063 BREAST TOMOSYNTHESIS BI: CPT

## 2025-04-18 PROCEDURE — 77067 SCR MAMMO BI INCL CAD: CPT

## 2025-05-07 RX ORDER — ERGOCALCIFEROL 1.25 MG/1
50000 CAPSULE, LIQUID FILLED ORAL WEEKLY
Qty: 5 CAPSULE | Refills: 0 | Status: SHIPPED | OUTPATIENT
Start: 2025-05-07

## 2025-05-27 ENCOUNTER — LAB (OUTPATIENT)
Dept: FAMILY MEDICINE CLINIC | Facility: CLINIC | Age: 50
End: 2025-05-27
Payer: MEDICAID

## 2025-05-27 ENCOUNTER — OFFICE VISIT (OUTPATIENT)
Dept: FAMILY MEDICINE CLINIC | Facility: CLINIC | Age: 50
End: 2025-05-27
Payer: MEDICAID

## 2025-05-27 VITALS
OXYGEN SATURATION: 97 % | TEMPERATURE: 97.2 F | DIASTOLIC BLOOD PRESSURE: 60 MMHG | SYSTOLIC BLOOD PRESSURE: 117 MMHG | WEIGHT: 248.7 LBS | RESPIRATION RATE: 16 BRPM | HEIGHT: 64 IN | HEART RATE: 60 BPM | BODY MASS INDEX: 42.46 KG/M2

## 2025-05-27 DIAGNOSIS — M25.572 ACUTE LEFT ANKLE PAIN: ICD-10-CM

## 2025-05-27 DIAGNOSIS — R53.82 CHRONIC FATIGUE: ICD-10-CM

## 2025-05-27 DIAGNOSIS — E55.9 VITAMIN D DEFICIENCY: ICD-10-CM

## 2025-05-27 DIAGNOSIS — M25.50 PAIN IN JOINT INVOLVING MULTIPLE SITES: ICD-10-CM

## 2025-05-27 DIAGNOSIS — Z82.62 FAMILY HISTORY OF OSTEOPOROSIS: ICD-10-CM

## 2025-05-27 DIAGNOSIS — M79.672 LEFT FOOT PAIN: ICD-10-CM

## 2025-05-27 DIAGNOSIS — M25.50 PAIN IN JOINT INVOLVING MULTIPLE SITES: Primary | ICD-10-CM

## 2025-05-27 DIAGNOSIS — Z91.81 HISTORY OF FALL: ICD-10-CM

## 2025-05-27 LAB
CHROMATIN AB SERPL-ACNC: <10 IU/ML (ref 0–14)
CRP SERPL-MCNC: 1.19 MG/DL (ref 0–0.5)
ERYTHROCYTE [SEDIMENTATION RATE] IN BLOOD: 23 MM/HR (ref 0–20)

## 2025-05-27 PROCEDURE — 85652 RBC SED RATE AUTOMATED: CPT | Performed by: FAMILY MEDICINE

## 2025-05-27 PROCEDURE — 86431 RHEUMATOID FACTOR QUANT: CPT | Performed by: FAMILY MEDICINE

## 2025-05-27 PROCEDURE — 36415 COLL VENOUS BLD VENIPUNCTURE: CPT

## 2025-05-27 PROCEDURE — 86038 ANTINUCLEAR ANTIBODIES: CPT | Performed by: FAMILY MEDICINE

## 2025-05-27 PROCEDURE — 86140 C-REACTIVE PROTEIN: CPT | Performed by: FAMILY MEDICINE

## 2025-05-27 NOTE — PROGRESS NOTES
Subjective   Catherine Nicholas is a 49 y.o. female.     History of Present Illness     History of Present Illness  The patient is a 49-year-old female who presents with multiple joint pain. She reports experiencing widespread joint pain, which began a few weeks ago. The initial discomfort was noted in her lower back, described as an unusual pressure sensation. Subsequently, she experienced pain in her left ankle, a recurrence of a similar episode that occurred approximately more then 1 years ago. She also reports neck stiffness and generalized body aches.     She recounts an incident a week ago  where she fell while descending stairs due to weakness in her feet, resulting in twisting her ankle. She reports swelling in her left ankle and left foot pain following the fall. She has been taking Aleve twice daily for pain management.    Additionally, she reports excessive fatigue, often day time sleeping. Despite adequate nighttime sleep, she continues to feel fatigued. She has been evaluated for sleep apnea and provided with a CPAP machine, but its use induces coughing and gagging, rendering it unusable.    There is a family history of bone-related issues, with her mother receiving shots for osteoporosis and her sister having early signs of the condition.     The following portions of the patient's history were reviewed and updated as appropriate: past medical history, past social history, past surgical history and problem list.    Review of Systems   Constitutional:  Positive for fatigue.   Respiratory:  Negative for shortness of breath.    Musculoskeletal:  Positive for arthralgias, back pain and joint swelling.        Multiple joint pain, left foot pain and ankle swelling   Skin:  Negative for rash.         Objective   Physical Exam  Vitals reviewed.   Pulmonary:      Effort: Pulmonary effort is normal.      Breath sounds: Normal breath sounds.   Musculoskeletal:      Left ankle: Swelling present.      Left foot:  Decreased range of motion.      Comments: + left foot tenderness   Feet:      Right foot:      Skin integrity: No erythema.      Left foot:      Skin integrity: No erythema.   Skin:     Findings: No erythema or rash.   Neurological:      General: No focal deficit present.      Mental Status: She is alert and oriented to person, place, and time.         Vitals:    05/27/25 1005   BP: 117/60   Pulse: 60   Resp: 16   Temp: 97.2 °F (36.2 °C)   SpO2: 97%     Current Outpatient Medications on File Prior to Visit   Medication Sig Dispense Refill    albuterol sulfate  (90 Base) MCG/ACT inhaler Inhale 2 puffs Every 4 (Four) Hours As Needed for Shortness of Air. 18 g 3    levothyroxine (SYNTHROID, LEVOTHROID) 137 MCG tablet Take 1 tablet by mouth Daily. 90 tablet 3    PATIENT SUPPLIED ALLERGY INJECTION Inject  under the skin into the appropriate area as directed 1 (One) Time.      vitamin D (ERGOCALCIFEROL) 1.25 MG (76978 UT) capsule capsule Take 1 capsule by mouth once a week 5 capsule 0     No current facility-administered medications on file prior to visit.         Assessment & Plan   Problems Addressed this Visit       Fatigue    Relevant Orders    Sedimentation rate, automated (Completed)    JACKELYN (Completed)    Rheumatoid Factor, Quant (Completed)    C-reactive protein (Completed)    Ambulatory Referral to Rheumatology    DEXA Bone Density Axial    Vitamin D deficiency    Relevant Orders    Ambulatory Referral to Rheumatology     Other Visit Diagnoses         Pain in joint involving multiple sites    -  Primary    Relevant Orders    Sedimentation rate, automated (Completed)    JACKELYN (Completed)    Rheumatoid Factor, Quant (Completed)    C-reactive protein (Completed)    Ambulatory Referral to Rheumatology    DEXA Bone Density Axial      Family history of osteoporosis        Relevant Orders    DEXA Bone Density Axial      Acute left ankle pain          History of fall        Relevant Orders    XR Foot 3+ View Left       Left foot pain        Relevant Orders    XR Foot 3+ View Left          Diagnoses         Codes Comments      Pain in joint involving multiple sites    -  Primary ICD-10-CM: M25.50  ICD-9-CM: 719.49       Chronic fatigue     ICD-10-CM: R53.82  ICD-9-CM: 780.79       Vitamin D deficiency     ICD-10-CM: E55.9  ICD-9-CM: 268.9       Family history of osteoporosis     ICD-10-CM: Z82.62  ICD-9-CM: V17.81       Acute left ankle pain     ICD-10-CM: M25.572  ICD-9-CM: 719.47       History of fall     ICD-10-CM: Z91.81  ICD-9-CM: V15.88       Left foot pain     ICD-10-CM: M79.672  ICD-9-CM: 729.5             Assessment & Plan  1. Multiple joint pain.  - Pain reported in lower back, left ankle, neck, right wrist, and right knee, starting a couple of weeks ago.  - we will check labs.  - Referral to rheumatology will be provider.    2. Fatigue.  - Reports feeling very exhausted during the day despite adequate sleep at night.  - Previous evaluation for sleep apnea resulted in the use of a machine, which causes coughing and gagging, making it unusable.      3. Osteoporosis screening.  - Family history of osteoporosis.  - DEXA scan will be ordered to check for osteopenia or osteoporosis.  - Low vitamin D -continue vitamin D supplement.    4. Left ankle/foot pain.  - Advised ibuprofen-we will check foot x-ray.             Patient or patient representative verbalized consent for the use of Ambient Listening during the visit with  Gilberto Manzanares MD for chart documentation. 5/31/2025  10:16 EDT

## 2025-05-28 LAB — ANA SER QL: NEGATIVE

## 2025-06-02 ENCOUNTER — RESULTS FOLLOW-UP (OUTPATIENT)
Dept: FAMILY MEDICINE CLINIC | Facility: CLINIC | Age: 50
End: 2025-06-02
Payer: MEDICAID

## 2025-06-10 ENCOUNTER — HOSPITAL ENCOUNTER (OUTPATIENT)
Dept: BONE DENSITY | Facility: HOSPITAL | Age: 50
Discharge: HOME | End: 2025-06-10
Admitting: FAMILY MEDICINE
Payer: MEDICAID

## 2025-06-10 PROCEDURE — 77080 DXA BONE DENSITY AXIAL: CPT

## 2025-06-13 DIAGNOSIS — Z12.11 COLON CANCER SCREENING: Primary | ICD-10-CM

## 2025-06-19 RX ORDER — ERGOCALCIFEROL 1.25 MG/1
50000 CAPSULE, LIQUID FILLED ORAL WEEKLY
Qty: 5 CAPSULE | Refills: 0 | Status: SHIPPED | OUTPATIENT
Start: 2025-06-19

## 2025-07-09 ENCOUNTER — OFFICE VISIT (OUTPATIENT)
Dept: CARDIOLOGY | Facility: CLINIC | Age: 50
End: 2025-07-09
Payer: MEDICAID

## 2025-07-09 VITALS
HEART RATE: 62 BPM | BODY MASS INDEX: 47.11 KG/M2 | DIASTOLIC BLOOD PRESSURE: 68 MMHG | HEIGHT: 62 IN | SYSTOLIC BLOOD PRESSURE: 125 MMHG | WEIGHT: 256 LBS | OXYGEN SATURATION: 96 %

## 2025-07-09 DIAGNOSIS — R06.09 DYSPNEA ON EXERTION: Primary | ICD-10-CM

## 2025-07-09 DIAGNOSIS — I20.89 ANGINAL EQUIVALENT: ICD-10-CM

## 2025-07-09 DIAGNOSIS — E78.2 MIXED HYPERLIPIDEMIA: ICD-10-CM

## 2025-07-09 PROCEDURE — 99213 OFFICE O/P EST LOW 20 MIN: CPT | Performed by: NURSE PRACTITIONER

## 2025-07-09 PROCEDURE — 93000 ELECTROCARDIOGRAM COMPLETE: CPT | Performed by: NURSE PRACTITIONER

## 2025-07-09 RX ORDER — SODIUM CHLORIDE 0.9 % (FLUSH) 0.9 %
10 SYRINGE (ML) INJECTION AS NEEDED
OUTPATIENT
Start: 2025-07-09

## 2025-07-09 RX ORDER — METOPROLOL TARTRATE 1 MG/ML
5 INJECTION, SOLUTION INTRAVENOUS
OUTPATIENT
Start: 2025-07-09

## 2025-07-09 RX ORDER — METOPROLOL TARTRATE 25 MG/1
50 TABLET, FILM COATED ORAL ONCE
OUTPATIENT
Start: 2025-07-09

## 2025-07-09 RX ORDER — METOPROLOL TARTRATE 25 MG/1
150 TABLET, FILM COATED ORAL ONCE
OUTPATIENT
Start: 2025-07-09

## 2025-07-09 RX ORDER — METOPROLOL TARTRATE 25 MG/1
100 TABLET, FILM COATED ORAL ONCE
OUTPATIENT
Start: 2025-07-09

## 2025-07-09 RX ORDER — SODIUM CHLORIDE 0.9 % (FLUSH) 0.9 %
10 SYRINGE (ML) INJECTION EVERY 12 HOURS SCHEDULED
OUTPATIENT
Start: 2025-07-09

## 2025-07-09 RX ORDER — NITROGLYCERIN 0.4 MG/1
0.4 TABLET SUBLINGUAL
OUTPATIENT
Start: 2025-07-09 | End: 2025-07-09

## 2025-07-09 RX ORDER — METOPROLOL TARTRATE 50 MG
50 TABLET ORAL
OUTPATIENT
Start: 2025-07-09

## 2025-07-09 RX ORDER — SODIUM CHLORIDE 9 MG/ML
40 INJECTION, SOLUTION INTRAVENOUS AS NEEDED
OUTPATIENT
Start: 2025-07-09

## 2025-07-09 RX ORDER — NITROGLYCERIN 0.4 MG/1
0.8 TABLET SUBLINGUAL
OUTPATIENT
Start: 2025-07-09

## 2025-07-09 RX ORDER — METOPROLOL TARTRATE 25 MG/1
200 TABLET, FILM COATED ORAL ONCE
OUTPATIENT
Start: 2025-07-09 | End: 2025-07-09

## 2025-07-09 NOTE — PROGRESS NOTES
Subjective:     Encounter Date:07/09/2025      Patient ID: Catherine Nicholas is a 49 y.o. female.    Chief Complaint: one year follow up     History of Present Illness    Ms. Catherine Nicholas has PMH of     Bradycardia  Morbid obesity BMI over 42  Hypothyroidism, on levothyroxine therapy  Former smoker  Tubal ligation  Family history negative for premature CAD, NKDA         Here for follow-up.  Patient is complaining of feeling tired and fatigued and sleeping all day.  Has occasional shortness of breath where she has to sigh and take deep breaths.  She reports this can happen when driving watching TV or any time of the day does not occur with activity feels like skipping a beat comes on quickly goes away quickly.  Denies any chest pain.  Occasional shortness of breath with exertion and chronic lower extremity edema he is also started to notice having worsening headaches.      Blood pressure in office today 125/68 heart rate 62 oxygen 96% on room air weight 256 pounds BMI 46.82             Previous workup  Work-up revealed Holter monitor 10/15/2021 with heart rates from 37-89 mean of 55 bpm.  Echo 10/28/2021 revealed EF of 60 to 60% with PA systolic pressure of 20 mmHg.       Lab Review:   Labs from 4/11/2025 CMP unremarkable TSH normal HDL 56     5/27/2025 CRP and sed rate mildly elevated JACKELYN and rheumatoid factor negative    The following portions of the patient's history were reviewed and updated as appropriate: allergies, current medications, past family history, past medical history, past social history, past surgical history, and problem list.    Review of Systems   Constitutional: Positive for malaise/fatigue.   Cardiovascular:  Positive for leg swelling and palpitations. Negative for chest pain and dyspnea on exertion.   Respiratory:  Positive for shortness of breath. Negative for cough.    Gastrointestinal:  Negative for abdominal pain, nausea and vomiting.   Neurological:  Negative for dizziness,  "headaches, light-headedness, numbness and weakness.   All other systems reviewed and are negative.          Past Medical History:   Diagnosis Date    Bradycardia     Frozen shoulder December 2023    Not sure if its a frozen shoulder but it hurts to more it or use it    Hypothyroidism     Palpitation      Past Surgical History:   Procedure Laterality Date    BREAST BIOPSY      TUBAL ABDOMINAL LIGATION  June 2010     /68 (BP Location: Left arm, Patient Position: Sitting, Cuff Size: Adult)   Pulse 62   Ht 157.5 cm (62\")   Wt 116 kg (256 lb)   SpO2 96%   BMI 46.82 kg/m²   Family History   Problem Relation Age of Onset    No Known Problems Mother     No Known Problems Father     Sleep apnea Neg Hx        Current Outpatient Medications:     albuterol sulfate  (90 Base) MCG/ACT inhaler, Inhale 2 puffs Every 4 (Four) Hours As Needed for Shortness of Air., Disp: 18 g, Rfl: 3    levothyroxine (SYNTHROID, LEVOTHROID) 137 MCG tablet, Take 1 tablet by mouth Daily., Disp: 90 tablet, Rfl: 3    PATIENT SUPPLIED ALLERGY INJECTION, Inject  under the skin into the appropriate area as directed 1 (One) Time., Disp: , Rfl:     vitamin D (ERGOCALCIFEROL) 1.25 MG (29250 UT) capsule capsule, Take 1 capsule by mouth 1 (One) Time Per Week., Disp: 5 capsule, Rfl: 0  No Known Allergies  Social History     Socioeconomic History    Marital status:    Tobacco Use    Smoking status: Former     Current packs/day: 1.00     Average packs/day: 1 pack/day for 15.0 years (15.0 ttl pk-yrs)     Types: Cigarettes     Passive exposure: Past    Smokeless tobacco: Never    Tobacco comments:     I smoked for almost 20 years quit in 2009   Vaping Use    Vaping status: Never Used   Substance and Sexual Activity    Alcohol use: Not Currently    Drug use: Never    Sexual activity: Yes     Partners: Female, Male     Birth control/protection: Tubal ligation                Objective:     Vitals reviewed.   Cardiovascular:      PMI at left " midclavicular line. Normal rate. Regular rhythm. Normal S1. Normal S2.       Murmurs: There is no murmur.      No gallop.  No click. No rub.   Pulses:     Intact distal pulses.   Edema:     Peripheral edema present.     Pretibial: bilateral trace edema of the pretibial area.     Ankle: bilateral trace edema of the ankle.     Feet: bilateral trace edema of the feet.        ECG 12 Lead    Date/Time: 7/9/2025 2:13 PM  Performed by: Catherine Lainez APRN    Authorized by: Catherine Lainez APRN  Comparison: compared with previous ECG from 7/9/2024  Comparison to previous ECG: Previously sinus bradycardia  Rhythm: sinus rhythm  Rate: normal  BPM: 61    Clinical impression: normal ECG                        Assessment:     [facility]       Diagnosis Plan   1. Dyspnea on exertion  CT Angiogram Coronary    CT Angiogram Coronary-Cardiology Interpretation    metoprolol tartrate (LOPRESSOR) tablet 200 mg    metoprolol tartrate (LOPRESSOR) tablet 150 mg    metoprolol tartrate (LOPRESSOR) tablet 100 mg    metoprolol tartrate (LOPRESSOR) tablet 50 mg    metoprolol tartrate (LOPRESSOR) tablet 50 mg    metoprolol tartrate (LOPRESSOR) injection 5 mg    nitroglycerin (NITROSTAT) SL tablet 0.4 mg    nitroglycerin (NITROSTAT) SL tablet 0.8 mg    No Caffeine or Nicotine 4 Hours Prior to CTA Appointment    Nothing to Eat or Drink 4 Hours Prior to CTA Appointment    Do Not Take Phosphodiasterase Inhibitors in the 72 Hours Prior to Coronary CTA    Obtain Informed Consent - Computed Tomography Angiography of Chest - Angiogram of Coronary Arteries    Vital Signs Upon Arrival    Cardiac Monitoring    Verify NPO Status - Patient to Be NPO at Least 4 Hours Prior to CTA    Notify CT After Administration of metoprolol tartrate (LOPRESSOR) tablet    Notify Provider If Total Metoprolol Given Equals 300mg & Heart Rate Not At Goal    Notify Provider Prior to Administration of Nitroglycerin if Patient SBP <80    POC Creatinine    Insert Peripheral IV    Saline  Lock & Maintain IV Access    sodium chloride 0.9 % flush 10 mL    sodium chloride 0.9 % flush 10 mL    sodium chloride 0.9 % infusion 40 mL    Vital Signs - See Instructions    Hold Medication Metformin (Glucophage, Glucophage XR, Fortament, Glumetza);  Metglip (metformin/glipizide);  Glucovance (metformin/glyburide); Avandamet (metformin/rosiglitazone)    Patient May Discharge Home After Procedure Complete (If Stable)    No Metoprolol Prescription Needed      2. Anginal equivalent  CT Angiogram Coronary    CT Angiogram Coronary-Cardiology Interpretation    metoprolol tartrate (LOPRESSOR) tablet 200 mg    metoprolol tartrate (LOPRESSOR) tablet 150 mg    metoprolol tartrate (LOPRESSOR) tablet 100 mg    metoprolol tartrate (LOPRESSOR) tablet 50 mg    metoprolol tartrate (LOPRESSOR) tablet 50 mg    metoprolol tartrate (LOPRESSOR) injection 5 mg    nitroglycerin (NITROSTAT) SL tablet 0.4 mg    nitroglycerin (NITROSTAT) SL tablet 0.8 mg    No Caffeine or Nicotine 4 Hours Prior to CTA Appointment    Nothing to Eat or Drink 4 Hours Prior to CTA Appointment    Do Not Take Phosphodiasterase Inhibitors in the 72 Hours Prior to Coronary CTA    Obtain Informed Consent - Computed Tomography Angiography of Chest - Angiogram of Coronary Arteries    Vital Signs Upon Arrival    Cardiac Monitoring    Verify NPO Status - Patient to Be NPO at Least 4 Hours Prior to CTA    Notify CT After Administration of metoprolol tartrate (LOPRESSOR) tablet    Notify Provider If Total Metoprolol Given Equals 300mg & Heart Rate Not At Goal    Notify Provider Prior to Administration of Nitroglycerin if Patient SBP <80    POC Creatinine    Insert Peripheral IV    Saline Lock & Maintain IV Access    sodium chloride 0.9 % flush 10 mL    sodium chloride 0.9 % flush 10 mL    sodium chloride 0.9 % infusion 40 mL    Vital Signs - See Instructions    Hold Medication Metformin (Glucophage, Glucophage XR, Fortament, Glumetza);  Metglip (metformin/glipizide);   Glucovance (metformin/glyburide); Avandamet (metformin/rosiglitazone)    Patient May Discharge Home After Procedure Complete (If Stable)    No Metoprolol Prescription Needed      3. Mixed hyperlipidemia                       Plan:   Chart reviewed  Labs reviewed  Patient is having shortness of breath on exertion may be anginal equivalent some palpitations will check coronary CTA to rule out ischemia  Reviewed previous echocardiogram from 9/6/2024 that was unremarkable    Continue treatment with levothyroxine and vitamin D    Also wondering if some of the symptoms could be premenopause symptoms    Electronically signed by TAVON Larsen, 07/11/25, 4:27 PM EDT.      This document is intended for medical expert use only.  Reading of this document by patients and/or patient's family without participating medical staff guidance may result in misinterpretation and unintended morbidity. Any interpretation of such data is the responsibility of the patient and/or family member responsible for the patient in concert with their primary or specialist providers, not to be left for sources of online search as such as [employer], [employer] or similar queries.  Relying on these approaches to knowledge may result in misinterpretation, misguided goals of care and even death should patient or family members try recommendations outside of the realm of professional medical care in a supervised inpatient environment.

## 2025-07-14 RX ORDER — LEVOTHYROXINE SODIUM 137 UG/1
137 TABLET ORAL DAILY
Qty: 30 TABLET | Refills: 0 | Status: SHIPPED | OUTPATIENT
Start: 2025-07-14

## 2025-07-24 RX ORDER — ERGOCALCIFEROL 1.25 MG/1
50000 CAPSULE, LIQUID FILLED ORAL WEEKLY
Qty: 5 CAPSULE | Refills: 0 | Status: SHIPPED | OUTPATIENT
Start: 2025-07-24

## 2025-08-14 RX ORDER — LEVOTHYROXINE SODIUM 137 UG/1
137 TABLET ORAL DAILY
Qty: 30 TABLET | Refills: 0 | Status: SHIPPED | OUTPATIENT
Start: 2025-08-14

## 2025-08-26 ENCOUNTER — HOSPITAL ENCOUNTER (OUTPATIENT)
Dept: CT IMAGING | Facility: HOSPITAL | Age: 50
Discharge: HOME | End: 2025-08-26
Payer: MEDICAID

## 2025-08-26 VITALS — RESPIRATION RATE: 19 BRPM | HEART RATE: 49 BPM | SYSTOLIC BLOOD PRESSURE: 93 MMHG | DIASTOLIC BLOOD PRESSURE: 46 MMHG

## 2025-08-26 DIAGNOSIS — R06.09 DYSPNEA ON EXERTION: ICD-10-CM

## 2025-08-26 DIAGNOSIS — I20.89 ANGINAL EQUIVALENT: ICD-10-CM

## 2025-08-26 LAB
CREAT BLDA-MCNC: 0.8 MG/DL (ref 0.6–1.3)
EGFRCR SERPLBLD CKD-EPI 2021: 89.3 ML/MIN/1.73

## 2025-08-26 PROCEDURE — 82565 ASSAY OF CREATININE: CPT | Performed by: NURSE PRACTITIONER

## 2025-08-26 PROCEDURE — 25510000001 IOPAMIDOL PER 1 ML: Performed by: NURSE PRACTITIONER

## 2025-08-26 PROCEDURE — 75574 CT ANGIO HRT W/3D IMAGE: CPT

## 2025-08-26 RX ORDER — METOPROLOL TARTRATE 1 MG/ML
5 INJECTION, SOLUTION INTRAVENOUS
Status: DISCONTINUED | OUTPATIENT
Start: 2025-08-26 | End: 2025-08-27 | Stop reason: HOSPADM

## 2025-08-26 RX ORDER — METOPROLOL TARTRATE 50 MG
50 TABLET ORAL
Status: DISCONTINUED | OUTPATIENT
Start: 2025-08-26 | End: 2025-08-27 | Stop reason: HOSPADM

## 2025-08-26 RX ORDER — METOPROLOL TARTRATE 50 MG
50 TABLET ORAL ONCE
Status: DISCONTINUED | OUTPATIENT
Start: 2025-08-26 | End: 2025-08-27 | Stop reason: HOSPADM

## 2025-08-26 RX ORDER — METOPROLOL TARTRATE 50 MG
100 TABLET ORAL ONCE
Status: DISCONTINUED | OUTPATIENT
Start: 2025-08-26 | End: 2025-08-27 | Stop reason: HOSPADM

## 2025-08-26 RX ORDER — SODIUM CHLORIDE 0.9 % (FLUSH) 0.9 %
10 SYRINGE (ML) INJECTION AS NEEDED
Status: DISCONTINUED | OUTPATIENT
Start: 2025-08-26 | End: 2025-08-27 | Stop reason: HOSPADM

## 2025-08-26 RX ORDER — NITROGLYCERIN 0.4 MG/1
0.8 TABLET SUBLINGUAL
Status: COMPLETED | OUTPATIENT
Start: 2025-08-26 | End: 2025-08-26

## 2025-08-26 RX ORDER — METOPROLOL TARTRATE 50 MG
200 TABLET ORAL ONCE
Status: DISCONTINUED | OUTPATIENT
Start: 2025-08-26 | End: 2025-08-27 | Stop reason: HOSPADM

## 2025-08-26 RX ORDER — SODIUM CHLORIDE 0.9 % (FLUSH) 0.9 %
10 SYRINGE (ML) INJECTION EVERY 12 HOURS SCHEDULED
Status: DISCONTINUED | OUTPATIENT
Start: 2025-08-26 | End: 2025-08-27 | Stop reason: HOSPADM

## 2025-08-26 RX ORDER — SODIUM CHLORIDE 9 MG/ML
40 INJECTION, SOLUTION INTRAVENOUS AS NEEDED
Status: DISCONTINUED | OUTPATIENT
Start: 2025-08-26 | End: 2025-08-27 | Stop reason: HOSPADM

## 2025-08-26 RX ORDER — METOPROLOL TARTRATE 50 MG
150 TABLET ORAL ONCE
Status: DISCONTINUED | OUTPATIENT
Start: 2025-08-26 | End: 2025-08-27 | Stop reason: HOSPADM

## 2025-08-26 RX ORDER — NITROGLYCERIN 0.4 MG/1
0.4 TABLET SUBLINGUAL
Status: COMPLETED | OUTPATIENT
Start: 2025-08-26 | End: 2025-08-26

## 2025-08-26 RX ORDER — IOPAMIDOL 755 MG/ML
100 INJECTION, SOLUTION INTRAVASCULAR
Status: COMPLETED | OUTPATIENT
Start: 2025-08-26 | End: 2025-08-26

## 2025-08-26 RX ADMIN — Medication 10 ML: at 10:00

## 2025-08-26 RX ADMIN — IOPAMIDOL 100 ML: 755 INJECTION, SOLUTION INTRAVENOUS at 10:17

## 2025-08-26 RX ADMIN — NITROGLYCERIN 0.8 MG: 0.4 TABLET SUBLINGUAL at 10:05

## 2025-08-29 RX ORDER — ERGOCALCIFEROL 1.25 MG/1
50000 CAPSULE, LIQUID FILLED ORAL WEEKLY
Qty: 5 CAPSULE | Refills: 0 | Status: SHIPPED | OUTPATIENT
Start: 2025-08-29